# Patient Record
Sex: FEMALE | Employment: UNEMPLOYED | URBAN - METROPOLITAN AREA
[De-identification: names, ages, dates, MRNs, and addresses within clinical notes are randomized per-mention and may not be internally consistent; named-entity substitution may affect disease eponyms.]

---

## 2017-01-11 DIAGNOSIS — M54.17 LUMBOSACRAL RADICULOPATHY AT L5: Primary | ICD-10-CM

## 2017-01-12 ENCOUNTER — TELEPHONE (OUTPATIENT)
Dept: ANESTHESIOLOGY | Facility: CLINIC | Age: 52
End: 2017-01-12

## 2017-01-17 ENCOUNTER — CARE COORDINATION (OUTPATIENT)
Dept: ANESTHESIOLOGY | Facility: CLINIC | Age: 52
End: 2017-01-17

## 2017-01-17 NOTE — PROGRESS NOTES
"Purpose of call: Follow up after left L4 Transforaminal Epidural Steroid performed 01/04/2017  Spoke with: Lesly    Percentage of left lower extremity pain relief after procedure: 50%    Lesly states \"I still have shooting pain in my toes on my left side, but it's much improved.\"     Follow up: She will make an appointment for an MRI and neurosurgery, then schedule a follow up appt with Dr. Villar after these two appointments.  "

## 2017-01-22 DIAGNOSIS — E03.9 HYPOTHYROIDISM, UNSPECIFIED TYPE: ICD-10-CM

## 2017-01-22 LAB
T4 FREE SERPL-MCNC: 0.96 NG/DL (ref 0.76–1.46)
TSH SERPL DL<=0.05 MIU/L-ACNC: 2.89 MU/L (ref 0.4–4)

## 2017-01-22 PROCEDURE — 84443 ASSAY THYROID STIM HORMONE: CPT | Performed by: NURSE PRACTITIONER

## 2017-01-22 PROCEDURE — 84439 ASSAY OF FREE THYROXINE: CPT | Performed by: NURSE PRACTITIONER

## 2017-01-22 NOTE — PROGRESS NOTES
Chief Complaint   Patient presents with     Blood Draw     Patient with Hypothyroidism here for peripheral lab draw from right arm by TEJAL Villanueva CMA January 22, 2017

## 2017-02-17 ENCOUNTER — TELEPHONE (OUTPATIENT)
Dept: ANESTHESIOLOGY | Facility: CLINIC | Age: 52
End: 2017-02-17

## 2017-02-17 DIAGNOSIS — M79.18 MYOFASCIAL PAIN: ICD-10-CM

## 2017-02-17 DIAGNOSIS — M54.16 LUMBAR RADICULOPATHY: ICD-10-CM

## 2017-02-17 DIAGNOSIS — M54.16 LEFT LUMBAR RADICULITIS: ICD-10-CM

## 2017-02-17 RX ORDER — GABAPENTIN 300 MG/1
300 CAPSULE ORAL 3 TIMES DAILY
Qty: 90 CAPSULE | Refills: 1 | Status: SHIPPED | OUTPATIENT
Start: 2017-02-17 | End: 2017-07-24

## 2017-02-17 RX ORDER — GABAPENTIN 100 MG/1
300 CAPSULE ORAL 2 TIMES DAILY
Qty: 180 CAPSULE | Refills: 1 | Status: SHIPPED | OUTPATIENT
Start: 2017-02-17 | End: 2017-04-02

## 2017-02-17 NOTE — TELEPHONE ENCOUNTER
Fax received from:[id number] - [place], [place] - [address] AT [facility]  Requesting refill for: Gabapentin 300 mg, Gabapentin 100 mg.  Medication is currently being prescribed by Doctor Villar.  JAVIDN reviewed Patient's chart, called pt and confirmed their dose.  Pt states they are still titrating up to the 300-300-900. And are currently taking 200-200-900.    This is a standard refill request with no adjustments made to the pt's dose.  Medication refilled and E-prescribed to pharmacy.  Last seen in clinic on 12/16/17 by Dr. Villar, who stated in note to titrate as tolerated to 300-300-900  Pt does not have a follow up appointment scheduled at this time, but was encouraged to contact the office to schedule an appointment and to [facility] with [facility] on how the Titration is going.      Pt was agreeable and verbalized understanding.   Nesha Santana LPN

## 2017-02-21 ENCOUNTER — OFFICE VISIT (OUTPATIENT)
Dept: INTERNAL MEDICINE | Facility: CLINIC | Age: 52
End: 2017-02-21

## 2017-02-21 VITALS
BODY MASS INDEX: 28.49 KG/M2 | DIASTOLIC BLOOD PRESSURE: 80 MMHG | HEART RATE: 88 BPM | WEIGHT: 155.8 LBS | SYSTOLIC BLOOD PRESSURE: 129 MMHG

## 2017-02-21 DIAGNOSIS — H81.12 BENIGN POSITIONAL VERTIGO, LEFT: ICD-10-CM

## 2017-02-21 DIAGNOSIS — E03.4 HYPOTHYROIDISM DUE TO ACQUIRED ATROPHY OF THYROID: Primary | ICD-10-CM

## 2017-02-21 DIAGNOSIS — H81.12 BENIGN POSITIONAL VERTIGO, LEFT: Primary | ICD-10-CM

## 2017-02-21 DIAGNOSIS — E03.4 HYPOTHYROIDISM DUE TO ACQUIRED ATROPHY OF THYROID: ICD-10-CM

## 2017-02-21 LAB
BASOPHILS # BLD AUTO: 0.1 10E9/L (ref 0–0.2)
BASOPHILS NFR BLD AUTO: 1 %
CRP SERPL-MCNC: <2.9 MG/L (ref 0–8)
DIFFERENTIAL METHOD BLD: NORMAL
EOSINOPHIL # BLD AUTO: 0.2 10E9/L (ref 0–0.7)
EOSINOPHIL NFR BLD AUTO: 1.7 %
ERYTHROCYTE [DISTWIDTH] IN BLOOD BY AUTOMATED COUNT: 12.6 % (ref 10–15)
ERYTHROCYTE [SEDIMENTATION RATE] IN BLOOD BY WESTERGREN METHOD: 12 MM/H (ref 0–20)
HCT VFR BLD AUTO: 41.1 % (ref 35–47)
HGB BLD-MCNC: 13.9 G/DL (ref 11.7–15.7)
IMM GRANULOCYTES # BLD: 0.1 10E9/L (ref 0–0.4)
IMM GRANULOCYTES NFR BLD: 0.8 %
LYMPHOCYTES # BLD AUTO: 3.5 10E9/L (ref 0.8–5.3)
LYMPHOCYTES NFR BLD AUTO: 35.4 %
MCH RBC QN AUTO: 30.9 PG (ref 26.5–33)
MCHC RBC AUTO-ENTMCNC: 33.8 G/DL (ref 31.5–36.5)
MCV RBC AUTO: 91 FL (ref 78–100)
MONOCYTES # BLD AUTO: 0.4 10E9/L (ref 0–1.3)
MONOCYTES NFR BLD AUTO: 3.9 %
NEUTROPHILS # BLD AUTO: 5.6 10E9/L (ref 1.6–8.3)
NEUTROPHILS NFR BLD AUTO: 57.2 %
NRBC # BLD AUTO: 0 10*3/UL
NRBC BLD AUTO-RTO: 0 /100
PLATELET # BLD AUTO: 288 10E9/L (ref 150–450)
RBC # BLD AUTO: 4.5 10E12/L (ref 3.8–5.2)
TSH SERPL DL<=0.05 MIU/L-ACNC: 6.07 MU/L (ref 0.4–4)
WBC # BLD AUTO: 9.9 10E9/L (ref 4–11)

## 2017-02-21 RX ORDER — ONDANSETRON 4 MG/1
TABLET, FILM COATED ORAL
Qty: 40 TABLET | Refills: 1 | Status: SHIPPED | OUTPATIENT
Start: 2017-02-21

## 2017-02-21 RX ORDER — LEVOTHYROXINE SODIUM 137 UG/1
137 TABLET ORAL DAILY
Qty: 90 TABLET | Refills: 3 | Status: SHIPPED | OUTPATIENT
Start: 2017-02-21 | End: 2017-04-04 | Stop reason: DRUGHIGH

## 2017-02-21 RX ORDER — MECLIZINE HYDROCHLORIDE 25 MG/1
25 TABLET ORAL 3 TIMES DAILY PRN
Qty: 40 TABLET | Refills: 1 | Status: SHIPPED | OUTPATIENT
Start: 2017-02-21

## 2017-02-21 NOTE — PROGRESS NOTES
HPI: Lesly Roman is a 51 year old female who comes in for medication refills and routine follow-up.  She developed acute dizziness which she noted upon rising from her bed yesterday.  It persisted throughout the day.  Worse with head movement.  She is concerned it could be related to her previous meningitis. She relates pressure in the posterior and left parietal discomfort. She has acute nausea with head movement but has no had any emesis.  She used her last Ondanstron.     States she is taking her levothyroxine daily on empty stomach.     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 She has been able to continue to work.    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    Current Outpatient Prescriptions   Medication Sig Dispense Refill     gabapentin (NEURONTIN) 100 MG capsule Take 3 capsules (300 mg) by mouth 2 times daily 180 capsule 1     gabapentin (NEURONTIN) 300 MG capsule Take 1 capsule (300 mg) by mouth 3 times daily Take 900mg at bedtime 90 capsule 1     DULoxetine (CYMBALTA) 60 MG EC capsule Take 1 capsule (60 mg) by mouth daily Take on daily for a week and if no side effects increase to 2 tabs per day 30 capsule 1     levothyroxine (SYNTHROID,LEVOTHROID) 100 MCG tablet Take 1 tablet (100 mcg) by mouth daily 90 tablet 3     order for DME Equipment being ordered: TENS 1 Units 0     omeprazole (PRILOSEC) 20 MG capsule Take 1 capsule (20 mg) by mouth daily 90 capsule 1     vitamin D (ERGOCALCIFEROL) 01860 UNIT capsule Take 50,000 Units by mouth once a week       cyclobenzaprine (FLEXERIL) 10 MG tablet Take 1 tablet (10 mg) by mouth 3 times daily as needed for muscle spasms 42  tablet 3     ondansetron (ZOFRAN) 4 MG tablet Take 1-2 pills every 4 hours for nausea as needed.  Maximum of 6 tablets daily 20 tablet 1         ALLERGIES: Penicillins    PAST MEDICAL HX:   Past Medical History   Diagnosis Date     Amenorrhea, secondary ~2002     per pt s/p endometrial ablation for excessive vaginal bleeding     Meningitis      viral/3 episodes/inpatient at this exam     Other specified hypothyroidism 6/30/2015       PAST SURGICAL HX:   Past Surgical History   Procedure Laterality Date     Cholecystectomy  1993     Tubal ligation  1993     Appendectomy  1983     Inject epidural transforaminal lumbar / sacral single N/A 4/28/2016     Procedure: INJECT EPIDURAL TRANSFORAMINAL LUMBAR / SACRAL SINGLE;  Surgeon: Jean Alba MD;  Location: [facility] OR     Hysteroscopy, ablate endometrium versapoint , combined       Inject epidural transforaminal lumbar / sacral single Left 1/4/2017     Procedure: INJECT EPIDURAL TRANSFORAMINAL LUMBAR / SACRAL SINGLE;  Surgeon: Huy Villar MD;  Location: [facility] OR       IMMUNIZATION HX:   Immunization History   Administered Date(s) Administered     TD (ADULT, 7+) 05/30/2002     Tdap (Adacel,Boostrix) 11/10/2010       SOCIAL HX:   Social History     Social History Narrative    .  4 grown children.  Has a boyfriend. Occupation:      ROS: 7 system ROS reviewed w/o changes except for above    OBJECTIVE:  /80 (BP Location: Right arm, Patient Position: Chair, Cuff Size: Adult Regular)  Pulse 88  Wt 70.7 kg (155 lb 12.8 oz)  BMI 28.49 kg/m2   Wt Readings from Last 1 Encounters:   02/21/17 70.7 kg (155 lb 12.8 oz)     Constitutional: mildly uncomfortable, pleasant   Eyes: anicteric,conjunctiva pink, normal extra-ocular movements. No nystagmus.    Ears, Nose and Throat:L tympanic membranes clear, Right  TM with air fluid levels, throat clear.   Neck: supple with full range of motion, no thyromegaly.   Cardiovascular: regular rate and rhythm, normal S1 and S2, no  murmurs, rubs or gallops, peripheral pulses full and symmetric   Respiratory: clear to auscultation, no wheezes or crackles, normal breath sounds   Gastrointestinal: positive bowel sounds, nontender, no hepatosplenomegaly, no masses   Musculoskeletal: full range of motion, no edema   Skin: no concerning lesions, no jaundice, temp normal   Neurological: normal gait,normal speech, no tremor.  She had nausea and dizziness with the Epley maneuver L >R.  Eply maneuver performed 4 times each side w/o change in dizziness.    Psychological: appropriate mood   LYMPH: no   supraclavicular, infraclavicular or inguinal nodes.    ASSESSMENT/PLAN:    Benign positional vertigo  Recommend  meclizine 25 mg tid until symptoms resolve.   She was given printed instructions on Eply maneuver to perform at home tid and gave a satisfactory performance of the exercise..    Results for orders placed or performed in visit on 01/22/17   TSH   Result Value Ref Range    TSH 2.89 0.40 - 4.00 mU/L   T4 free   Result Value Ref Range    T4 Free 0.96 0.76 - 1.46 ng/dL     Labs all normal.      Total time spent 25 minutes.  More than 50% of the time spent with Ms. Roman on counseling / coordinating her care    Dominga HARRIS CNP

## 2017-02-21 NOTE — MR AVS SNAPSHOT
After Visit Summary   2/21/2017    Lesly Roman    MRN: [id number]           Patient Information     Date Of Birth          1965        Visit Information        Provider Department      2/21/2017 2:00 PM Dominga Lakhani, APRN CNP [facility]        Today's Diagnoses     Benign positional vertigo, left    -  1    Hypothyroidism due to acquired atrophy of thyroid          Care Instructions      Benign Paroxysmal Positional Vertigo  Benign paroxysmal positional vertigo (BPPV) is a problem with the inner ear. The inner ear contains the vestibular system. This system is what helps you keep your balance. BPPV causes a feeling of spinning. It is a common problem of the vestibular system.  Understanding the vestibular system  The vestibular system of the ear is made up of very tiny parts. They include the utricle, saccule, and semicircular canals. The utricle is a tiny organ that contains calcium crystals. In some people, the crystals can move into the semicircular canals. When this happens, the system no longer works as it should. This causes BPPV. Benign means it is not life-threatening. Paroxysmal means it happens suddenly. Positional means that it happens when you move your head. Vertigo is a feeling of spinning.  What causes BPPV?  Causes include injury to your head or neck. Other problems with the vestibular system may cause BPPV. In many people, the cause of BPPV is not known.  Symptoms of BPPV  You many have repeated feelings of spinning (vertigo). The vertigo usually lasts less than 1 minute. Some movements, suchas rolling over in bed, can bring on vertigo.  Diagnosing BPPV  Your primary health care provider may diagnose and treat your BPPV. Or you may see an ear, nose, and throat doctor (otolaryngologist). In some cases, you may see a nervous system doctor (neurologist).  The health care provider will ask about your symptoms and your medical history. He or she will examine  you. You may have hearing and balance tests. As part of the exam, your health care provider may have you move your head and body in certain ways. If you have BPPV, the movements can bring on vertigo. Your provider will also look for abnormal movements of your eyes. You may have other tests to check your vestibular or nervous systems.  Treatment for BPPV  Your health care provider may try to move the calcium crystals. This is done by having you move your head and neck in certain ways. This treatment is safe and often works well. You may also be told to do these movements at home. You may still have vertigo for a few weeks. Your health care provider will recheck your symptoms, usually in about a month. Special physical therapy may also be part of treatment.  In rare cases surgery may be needed for BPPV that does not go away.     When to call the health care provider  Call your health care provider right away if you have any of these:    Symptoms that do not go away with treatment    Symptoms that get worse    New symptoms        1630-8766 [employer]. [address] [place] [address]. All rights reserved. This information is not intended as a substitute for professional medical care. Always follow your healthcare professional's instructions.              Follow-ups after your visit        Future tests that were ordered for you today     Open Future Orders        Priority Expected Expires Ordered    TSH Routine 3/27/2017 2/21/2018 2/21/2017    CBC with platelets differential Routine 2/21/2017 3/7/2017 2/21/2017    CRP inflammation Routine 2/21/2017 2/21/2018 2/21/2017    Erythrocyte sedimentation rate Routine 2/21/2017 2/21/2018 2/21/2017            Who to contact     Please call your clinic at [phone] to:    Ask questions about your health    Make or cancel appointments    Discuss your medicines    Learn about your test results    Speak to your doctor   If you have compliments or concerns  about an experience at your clinic, or if you wish to file a complaint, please contact [facility] Patient Relations at [phone] or email us at [email or web address]         Additional Information About Your Visit        [email or web address] Information     [employer] gives you secure access to your electronic health record. If you see a primary care provider, you can also send messages to your care team and make appointments. If you have questions, please call your primary care clinic.  If you do not have a primary care provider, please call [phone] and they will assist you.      [employer] is an electronic gateway that provides easy, online access to your medical records. With [employer], you can request a clinic appointment, read your test results, renew a prescription or communicate with your care team.     To access your existing account, please contact your [facility] or call [phone] for assistance.        Care EveryWhere ID     This is your Care EveryWhere ID. This could be used by other organizations to access your [place] medical records  [id number]        Your Vitals Were     Pulse BMI (Body Mass Index)                88 28.49 kg/m2           Blood Pressure from Last 3 Encounters:   02/21/17 129/80   01/04/17 132/89   12/16/16 (!) 136/92    Weight from Last 3 Encounters:   02/21/17 70.7 kg (155 lb 12.8 oz)   01/04/17 67.1 kg (148 lb)   12/16/16 67.1 kg (148 lb)                 Today's Medication Changes          These changes are accurate as of: 2/21/17  2:49 PM.  If you have any questions, ask your nurse or doctor.               Start taking these medicines.        Dose/Directions    meclizine 25 MG tablet   Commonly known as:  ANTIVERT   Used for:  Benign positional vertigo, left   Started by:  Dominga Lakhani APRN CNP        Dose:  25 mg   Take 1 tablet (25 mg) by mouth 3 times daily as needed for dizziness   Quantity:  40 tablet    Refills:  1            Where to get your medicines      These medications were sent to [id number] - [place], [place] - [address], [place] [address]     Phone:  [phone]     meclizine 25 MG tablet    ondansetron 4 MG tablet                Primary Care Provider Office Phone # Fax #    STEVEN Vivar [phone]       [facility] [address] [place] [address]  [facility] [id number]        Thank you!     Thank you for choosing [facility]  for your care. Our goal is always to provide you with excellent care. Hearing back from our patients is one way we can continue to improve our services. Please take a few minutes to complete the written survey that you may receive in the mail after your visit with us. Thank you!             Your Updated Medication List - Protect others around you: Learn how to safely use, store and throw away your medicines at www.disposemymeds.org.          This list is accurate as of: 2/21/17  2:49 PM.  Always use your most recent med list.                   Brand Name Dispense Instructions for use    cyclobenzaprine 10 MG tablet    FLEXERIL    42 tablet    Take 1 tablet (10 mg) by mouth 3 times daily as needed for muscle spasms       DULoxetine 60 MG EC capsule    CYMBALTA    30 capsule    Take 1 capsule (60 mg) by mouth daily Take on daily for a week and if no side effects increase to 2 tabs per day       * gabapentin 100 MG capsule    NEURONTIN    180 capsule    Take 3 capsules (300 mg) by mouth 2 times daily       * gabapentin 300 MG capsule    NEURONTIN    90 capsule    Take 1 capsule (300 mg) by mouth 3 times daily Take 900mg at bedtime       levothyroxine 100 MCG tablet    SYNTHROID/LEVOTHROID    90 tablet    Take 1 tablet (100 mcg) by mouth daily       meclizine 25 MG tablet    ANTIVERT    40 tablet    Take 1 tablet (25 mg) by mouth 3 times daily as needed for dizziness        omeprazole 20 MG CR capsule    priLOSEC    90 capsule    Take 1 capsule (20 mg) by mouth daily       ondansetron 4 MG tablet    ZOFRAN    40 tablet    Take 1-2 pills every 4 hours for nausea as needed.  Maximum of 6 tablets daily       order for DME     1 Units    Equipment being ordered: TENS       vitamin D 53346 UNIT capsule    ERGOCALCIFEROL     Take 50,000 Units by mouth once a week       * Notice:  This list has 2 medication(s) that are the same as other medications prescribed for you. Read the directions carefully, and ask your doctor or other care provider to review them with you.

## 2017-02-21 NOTE — PATIENT INSTRUCTIONS
Benign Paroxysmal Positional Vertigo  Benign paroxysmal positional vertigo (BPPV) is a problem with the inner ear. The inner ear contains the vestibular system. This system is what helps you keep your balance. BPPV causes a feeling of spinning. It is a common problem of the vestibular system.  Understanding the vestibular system  The vestibular system of the ear is made up of very tiny parts. They include the utricle, saccule, and semicircular canals. The utricle is a tiny organ that contains calcium crystals. In some people, the crystals can move into the semicircular canals. When this happens, the system no longer works as it should. This causes BPPV. Benign means it is not life-threatening. Paroxysmal means it happens suddenly. Positional means that it happens when you move your head. Vertigo is a feeling of spinning.  What causes BPPV?  Causes include injury to your head or neck. Other problems with the vestibular system may cause BPPV. In many people, the cause of BPPV is not known.  Symptoms of BPPV  You many have repeated feelings of spinning (vertigo). The vertigo usually lasts less than 1 minute. Some movements, suchas rolling over in bed, can bring on vertigo.  Diagnosing BPPV  Your primary health care provider may diagnose and treat your BPPV. Or you may see an ear, nose, and throat doctor (otolaryngologist). In some cases, you may see a nervous system doctor (neurologist).  The health care provider will ask about your symptoms and your medical history. He or she will examine you. You may have hearing and balance tests. As part of the exam, your health care provider may have you move your head and body in certain ways. If you have BPPV, the movements can bring on vertigo. Your provider will also look for abnormal movements of your eyes. You may have other tests to check your vestibular or nervous systems.  Treatment for BPPV  Your health care provider may try to move the calcium crystals. This is done  by having you move your head and neck in certain ways. This treatment is safe and often works well. You may also be told to do these movements at home. You may still have vertigo for a few weeks. Your health care provider will recheck your symptoms, usually in about a month. Special physical therapy may also be part of treatment.  In rare cases surgery may be needed for BPPV that does not go away.     When to call the health care provider  Call your health care provider right away if you have any of these:    Symptoms that do not go away with treatment    Symptoms that get worse    New symptoms        0765-0733 [employer]. [address] [place] [address], [place] [address]. All rights reserved. This information is not intended as a substitute for professional medical care. Always follow your healthcare professional's instructions.

## 2017-02-21 NOTE — LETTER
[employer] Customer Service  [facility]  [address], [address]  [place] [address]  Fax: [phone]  Phone: [phone]      2017      Lesly Roman  [address]  [address]  [place] [address]        Dear Lesly,    Thank you for your interest in becoming a [employer] user!    Your access code is: Activation code not generated  Current [employer] Status: Active     Please access the [employer] website at [email or web address].  Below the ID and password fields, select the  Sign Up Now  as New User.  You will be prompted to enter the access code listed above as well as additional personal information.  Please follow the directions carefully when creating your username and password.    If you allow your access code to , or if you have any questions please call a [employer] Representative at [phone] during normal clinic hours.     Sincerely,      [employer] Customer Service  [facility]

## 2017-02-22 NOTE — PROGRESS NOTES
HPI: Lesly Roman is a 51 year old female who comes in for > 24 hours of severe dizziness.  She woke with dizziness when she lino from bed.  She states she had severe dizziness with any movement of her head.  Worse when she sat up. She has pressure in her left lateral head This a.m. she also had severe nausea.  No nausea.   When she tried to walk to the bathroom she veered to the left. She had to walk along the wall for steadiness.  Her head felt like it would explode.  She felt like everything around her was moving.    She also had tingling in her tongue and in her jaw.     She also has some tingling in her left foot and hand but these are not new symptoms.     State she is taking levothyroxine daily on an empty stomach with no eating for one hour after taking.       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    Current Outpatient Prescriptions   Medication Sig Dispense Refill     levothyroxine (SYNTHROID/LEVOTHROID) 137 MCG tablet Take 1 tablet (137 mcg) by mouth daily 90 tablet 3     ondansetron (ZOFRAN) 4 MG tablet Take 1-2 pills every 4 hours for nausea as needed.  Maximum of 6 tablets daily 40 tablet 1     meclizine (ANTIVERT) 25 MG tablet Take 1 tablet (25 mg) by mouth 3 times daily as needed for dizziness 40 tablet 1     gabapentin (NEURONTIN) 100 MG capsule Take 3 capsules (300 mg) by mouth 2 times daily 180 capsule 1     gabapentin (NEURONTIN) 300 MG capsule Take 1 capsule (300 mg) by mouth 3 times daily Take 900mg at bedtime 90 capsule 1     DULoxetine (CYMBALTA) 60 MG EC capsule Take 1 capsule (60 mg) by mouth daily Take on daily for a week and if no side effects increase to 2 tabs per day 30  capsule 1     [DISCONTINUED] levothyroxine (SYNTHROID,LEVOTHROID) 100 MCG tablet Take 1 tablet (100 mcg) by mouth daily 90 tablet 3     order for DME Equipment being ordered: TENS 1 Units 0     omeprazole (PRILOSEC) 20 MG capsule Take 1 capsule (20 mg) by mouth daily 90 capsule 1     vitamin D (ERGOCALCIFEROL) 63942 UNIT capsule Take 50,000 Units by mouth once a week       cyclobenzaprine (FLEXERIL) 10 MG tablet Take 1 tablet (10 mg) by mouth 3 times daily as needed for muscle spasms 42 tablet 3         ALLERGIES: Penicillins    PAST MEDICAL HX:   Past Medical History   Diagnosis Date     Amenorrhea, secondary ~2002     per pt s/p endometrial ablation for excessive vaginal bleeding     Meningitis      viral/3 episodes/inpatient at this exam     Other specified hypothyroidism 6/30/2015       PAST SURGICAL HX:   Past Surgical History   Procedure Laterality Date     Cholecystectomy  1993     Tubal ligation  1993     Appendectomy  1983     Inject epidural transforaminal lumbar / sacral single N/A 4/28/2016     Procedure: INJECT EPIDURAL TRANSFORAMINAL LUMBAR / SACRAL SINGLE;  Surgeon: Jean Alba MD;  Location: [facility] OR     Hysteroscopy, ablate endometrium versapoint , combined       Inject epidural transforaminal lumbar / sacral single Left 1/4/2017     Procedure: INJECT EPIDURAL TRANSFORAMINAL LUMBAR / SACRAL SINGLE;  Surgeon: Huy Villar MD;  Location: [facility] OR       IMMUNIZATION HX:   Immunization History   Administered Date(s) Administered     TD (ADULT, 7+) 05/30/2002     Tdap (Adacel,Boostrix) 11/10/2010       SOCIAL HX:   Social History     Social History Narrative    .  4 grown children.  Has a boyfriend. Occupation:      ROS: 7 system ROS reviewed w/o changes except for above      OBJECTIVE:  There were no vitals taken for this visit.   Wt Readings from Last 1 Encounters:   02/21/17 70.7 kg (155 lb 12.8 oz)     Constitutional: no distress, comfortable, pleasant   Eyes: anicteric,conjunctiva pink,  normal extra-ocular movements.  No nystagmus noted. She voices severe dizziness with EOMI of the eyes.    Ears, Nose and Throat: right TM air/fluid levels of  tympanic membranes clear, left TM normal, nose clear and free of lesions, throat clear.   Neck: supple with full range of motion, no thyromegaly.   Cardiovascular: regular rate and rhythm, normal S1 and S2, no murmurs, rubs or gallops, peripheral pulses full and symmetric   Respiratory: clear to auscultation, no wheezes or crackles, normal breath sounds   Gastrointestinal: positive bowel sounds, nontender, no hepatosplenomegaly, no masses   Musculoskeletal: full range of motion, no edema   Skin: no concerning lesions, no jaundice, temp normal   Neurological: normal speech, no tremor   Psychological: appropriate mood       ASSESSMENT/PLAN:  Diagnoses and all orders for this visit:    Hypothyroidism due to acquired atrophy of thyroid  -     levothyroxine (SYNTHROID/LEVOTHROID) 137 MCG tablet; Take 1 tablet (137 mcg) by mouth daily    Sed rate, CRP, CBC and diff.     Benign positional vertigo  Meclazine 25 mg every 8 hours   Ondantestron 4-8 mg every 4 hours as needed.   She has been instructed paroxysmal benign positional vertigo exercises explained and performed by the pt in the exam room.  She was instructed to repeat these three times a day while symptomatic.    Total time spent 25 minutes.  More than 50% of the time spent with Ms. Roman on counseling / coordinating her care    Dominga HARRIS CNP

## 2017-03-21 ENCOUNTER — HOSPITAL ENCOUNTER (EMERGENCY)
Facility: CLINIC | Age: 52
Discharge: HOME | End: 2017-03-21
Attending: EMERGENCY MEDICINE | Admitting: EMERGENCY MEDICINE
Payer: COMMERCIAL

## 2017-03-21 ENCOUNTER — APPOINTMENT (OUTPATIENT)
Dept: MRI IMAGING | Facility: CLINIC | Age: 52
End: 2017-03-21
Attending: EMERGENCY MEDICINE
Payer: COMMERCIAL

## 2017-03-21 VITALS
TEMPERATURE: 98 F | BODY MASS INDEX: 27.6 KG/M2 | HEIGHT: 62 IN | HEART RATE: 80 BPM | WEIGHT: 150 LBS | OXYGEN SATURATION: 100 % | RESPIRATION RATE: 16 BRPM | DIASTOLIC BLOOD PRESSURE: 94 MMHG | SYSTOLIC BLOOD PRESSURE: 123 MMHG

## 2017-03-21 DIAGNOSIS — R42 VERTIGO: ICD-10-CM

## 2017-03-21 DIAGNOSIS — R11.2 NAUSEA AND VOMITING, INTRACTABILITY OF VOMITING NOT SPECIFIED, UNSPECIFIED VOMITING TYPE: ICD-10-CM

## 2017-03-21 LAB
ANION GAP SERPL CALCULATED.3IONS-SCNC: 8 MMOL/L (ref 3–14)
BASOPHILS # BLD AUTO: 0.1 10E9/L (ref 0–0.2)
BASOPHILS NFR BLD AUTO: 0.5 %
BUN SERPL-MCNC: 10 MG/DL (ref 7–30)
CALCIUM SERPL-MCNC: 8.6 MG/DL (ref 8.5–10.1)
CHLORIDE SERPL-SCNC: 107 MMOL/L (ref 94–109)
CO2 SERPL-SCNC: 28 MMOL/L (ref 20–32)
CREAT SERPL-MCNC: 0.67 MG/DL (ref 0.52–1.04)
DIFFERENTIAL METHOD BLD: NORMAL
EOSINOPHIL # BLD AUTO: 0.2 10E9/L (ref 0–0.7)
EOSINOPHIL NFR BLD AUTO: 1.7 %
ERYTHROCYTE [DISTWIDTH] IN BLOOD BY AUTOMATED COUNT: 13.4 % (ref 10–15)
GFR SERPL CREATININE-BSD FRML MDRD: NORMAL ML/MIN/1.7M2
GLUCOSE SERPL-MCNC: 91 MG/DL (ref 70–99)
HCT VFR BLD AUTO: 39.7 % (ref 35–47)
HGB BLD-MCNC: 13.1 G/DL (ref 11.7–15.7)
IMM GRANULOCYTES # BLD: 0 10E9/L (ref 0–0.4)
IMM GRANULOCYTES NFR BLD: 0.4 %
LYMPHOCYTES # BLD AUTO: 3.1 10E9/L (ref 0.8–5.3)
LYMPHOCYTES NFR BLD AUTO: 33.1 %
MCH RBC QN AUTO: 30.5 PG (ref 26.5–33)
MCHC RBC AUTO-ENTMCNC: 33 G/DL (ref 31.5–36.5)
MCV RBC AUTO: 92 FL (ref 78–100)
MONOCYTES # BLD AUTO: 0.5 10E9/L (ref 0–1.3)
MONOCYTES NFR BLD AUTO: 4.8 %
NEUTROPHILS # BLD AUTO: 5.6 10E9/L (ref 1.6–8.3)
NEUTROPHILS NFR BLD AUTO: 59.5 %
NRBC # BLD AUTO: 0 10*3/UL
NRBC BLD AUTO-RTO: 0 /100
PLATELET # BLD AUTO: 290 10E9/L (ref 150–450)
POTASSIUM SERPL-SCNC: 3.7 MMOL/L (ref 3.4–5.3)
RBC # BLD AUTO: 4.3 10E12/L (ref 3.8–5.2)
SODIUM SERPL-SCNC: 143 MMOL/L (ref 133–144)
T4 FREE SERPL-MCNC: 0.79 NG/DL (ref 0.76–1.46)
TSH SERPL DL<=0.05 MIU/L-ACNC: 15.7 MU/L (ref 0.4–4)
WBC # BLD AUTO: 9.5 10E9/L (ref 4–11)

## 2017-03-21 PROCEDURE — 84439 ASSAY OF FREE THYROXINE: CPT | Performed by: EMERGENCY MEDICINE

## 2017-03-21 PROCEDURE — 84443 ASSAY THYROID STIM HORMONE: CPT | Performed by: EMERGENCY MEDICINE

## 2017-03-21 PROCEDURE — 25500064 ZZH RX 255 OP 636: Performed by: EMERGENCY MEDICINE

## 2017-03-21 PROCEDURE — 96361 HYDRATE IV INFUSION ADD-ON: CPT | Mod: 59

## 2017-03-21 PROCEDURE — 99284 EMERGENCY DEPT VISIT MOD MDM: CPT | Mod: Z6 | Performed by: EMERGENCY MEDICINE

## 2017-03-21 PROCEDURE — 70549 MR ANGIOGRAPH NECK W/O&W/DYE: CPT

## 2017-03-21 PROCEDURE — 80048 BASIC METABOLIC PNL TOTAL CA: CPT | Performed by: EMERGENCY MEDICINE

## 2017-03-21 PROCEDURE — 25000132 ZZH RX MED GY IP 250 OP 250 PS 637: Performed by: EMERGENCY MEDICINE

## 2017-03-21 PROCEDURE — 99285 EMERGENCY DEPT VISIT HI MDM: CPT | Mod: 25

## 2017-03-21 PROCEDURE — 85025 COMPLETE CBC W/AUTO DIFF WBC: CPT | Performed by: EMERGENCY MEDICINE

## 2017-03-21 PROCEDURE — A9585 GADOBUTROL INJECTION: HCPCS | Performed by: EMERGENCY MEDICINE

## 2017-03-21 PROCEDURE — 70553 MRI BRAIN STEM W/O & W/DYE: CPT

## 2017-03-21 PROCEDURE — 25000128 H RX IP 250 OP 636: Performed by: EMERGENCY MEDICINE

## 2017-03-21 PROCEDURE — 96374 THER/PROPH/DIAG INJ IV PUSH: CPT

## 2017-03-21 RX ORDER — DIAZEPAM 5 MG
TABLET ORAL
Status: DISCONTINUED
Start: 2017-03-21 | End: 2017-03-21 | Stop reason: HOSPADM

## 2017-03-21 RX ORDER — LIDOCAINE 40 MG/G
CREAM TOPICAL
Status: DISCONTINUED | OUTPATIENT
Start: 2017-03-21 | End: 2017-03-21 | Stop reason: HOSPADM

## 2017-03-21 RX ORDER — DIAZEPAM 5 MG
5 TABLET ORAL ONCE
Status: COMPLETED | OUTPATIENT
Start: 2017-03-21 | End: 2017-03-21

## 2017-03-21 RX ORDER — DIAZEPAM 5 MG
5 TABLET ORAL EVERY 6 HOURS PRN
Qty: 12 TABLET | Refills: 0 | Status: SHIPPED | OUTPATIENT
Start: 2017-03-21 | End: 2017-06-19

## 2017-03-21 RX ORDER — GADOBUTROL 604.72 MG/ML
7.5 INJECTION INTRAVENOUS ONCE
Status: COMPLETED | OUTPATIENT
Start: 2017-03-21 | End: 2017-03-21

## 2017-03-21 RX ORDER — ONDANSETRON 2 MG/ML
8 INJECTION INTRAMUSCULAR; INTRAVENOUS ONCE
Status: COMPLETED | OUTPATIENT
Start: 2017-03-21 | End: 2017-03-21

## 2017-03-21 RX ADMIN — SODIUM CHLORIDE 1000 ML: 9 INJECTION, SOLUTION INTRAVENOUS at 13:21

## 2017-03-21 RX ADMIN — SODIUM CHLORIDE 1000 ML: 9 INJECTION, SOLUTION INTRAVENOUS at 15:30

## 2017-03-21 RX ADMIN — GADOBUTROL 7.5 ML: 604.72 INJECTION INTRAVENOUS at 14:33

## 2017-03-21 RX ADMIN — ONDANSETRON 8 MG: 2 INJECTION INTRAMUSCULAR; INTRAVENOUS at 13:21

## 2017-03-21 RX ADMIN — DIAZEPAM 5 MG: 5 TABLET ORAL at 13:22

## 2017-03-21 RX ADMIN — DIAZEPAM 5 MG: 5 TABLET ORAL at 15:30

## 2017-03-21 NOTE — ED NOTES
Pt was dropped off to the ED by her son with c/o dizziness, headache, nausea and some disorientation. She was seen by her primary MD about 3 weeks ago for the same symptoms and was started on Meclizine and Zofran. She also had some lip numbness that started about 3 weeks ago as well that comes and goes. The medication she was given has not helped. She states her lips are again numb today. No other neuro deficits to report. She has hx of migraines.

## 2017-03-21 NOTE — DISCHARGE INSTRUCTIONS
AUDIOLOGY AND AURAL REHABILITATION SERVICES  Floor 4    Appointments: [phone]  Provider Referrals: [phone]  Information: [phone]

## 2017-03-21 NOTE — ED AVS SNAPSHOT
[facility], Emergency Department    [address] [address]    Phone:  [phone]                                       Lesly Roman   MRN: [id number]    Department:  [facility], Emergency Department   Date of Visit:  3/21/2017           Patient Information     Date Of Birth          1965        Your diagnoses for this visit were:     Vertigo     Nausea and vomiting, intractability of vomiting not specified, unspecified vomiting type        You were seen by Berta Camacho MD.      Follow-up Information     Follow up with Dominga Lakhani APRN CNP In 1 week.    Specialty:  Nurse Practitioner    Contact information:    [facility]  [address]  [facility] [address] [phone]          Discharge Instructions       AUDIOLOGY AND AURAL REHABILITATION SERVICES  Floor 4    Appointments: [phone]  Provider Referrals: [phone]  Information: [phone]    24 Hour Appointment Hotline       To make an appointment at any [facility] clinic, call [phone] ([phone]). If you don't have a family doctor or clinic, we will help you find one. [facility] clinics are conveniently located to serve the needs of you and your family.             Review of your medicines      START taking        Dose / Directions Last dose taken    diazepam 5 MG tablet   Commonly known as:  VALIUM   Dose:  5 mg   Quantity:  12 tablet        Take 1 tablet (5 mg) by mouth every 6 hours as needed for anxiety or sleep   Refills:  0          Our records show that you are taking the medicines listed below. If these are incorrect, please call your family doctor or clinic.        Dose / Directions Last dose taken    cyclobenzaprine 10 MG tablet   Commonly known as:  FLEXERIL   Dose:  10 mg   Quantity:  42 tablet        Take 1 tablet (10 mg) by mouth 3 times daily as needed for muscle spasms   Refills:  3        DULoxetine 60 MG EC capsule   Commonly known as:  CYMBALTA   Dose:  60 mg    Quantity:  30 capsule        Take 1 capsule (60 mg) by mouth daily Take on daily for a week and if no side effects increase to 2 tabs per day   Refills:  1        * gabapentin 100 MG capsule   Commonly known as:  NEURONTIN   Dose:  300 mg   Quantity:  180 capsule        Take 3 capsules (300 mg) by mouth 2 times daily   Refills:  1        * gabapentin 300 MG capsule   Commonly known as:  NEURONTIN   Dose:  300 mg   Quantity:  90 capsule        Take 1 capsule (300 mg) by mouth 3 times daily Take 900mg at bedtime   Refills:  1        levothyroxine 137 MCG tablet   Commonly known as:  SYNTHROID/LEVOTHROID   Dose:  137 mcg   Quantity:  90 tablet        Take 1 tablet (137 mcg) by mouth daily   Refills:  3        meclizine 25 MG tablet   Commonly known as:  ANTIVERT   Dose:  25 mg   Quantity:  40 tablet        Take 1 tablet (25 mg) by mouth 3 times daily as needed for dizziness   Refills:  1        omeprazole 20 MG CR capsule   Commonly known as:  priLOSEC   Dose:  20 mg   Quantity:  90 capsule        Take 1 capsule (20 mg) by mouth daily   Refills:  1        ondansetron 4 MG tablet   Commonly known as:  ZOFRAN   Quantity:  40 tablet        Take 1-2 pills every 4 hours for nausea as needed.  Maximum of 6 tablets daily   Refills:  1        order for DME   Quantity:  1 Units        Equipment being ordered: TENS   Refills:  0        vitamin D 99630 UNIT capsule   Commonly known as:  ERGOCALCIFEROL   Dose:  19845 Units        Take 50,000 Units by mouth once a week   Refills:  0        * Notice:  This list has 2 medication(s) that are the same as other medications prescribed for you. Read the directions carefully, and ask your doctor or other care provider to review them with you.            Prescriptions were sent or printed at these locations (1 Prescription)                   Other Prescriptions                Printed at Department/Unit printer (1 of 1)         diazepam (VALIUM) 5 MG tablet                Procedures and  tests performed during your visit     Basic metabolic panel    CBC with platelets differential    MR Brain for Stroke Cmpl w/o & w Contras    Orthostatic blood pressure    Peripheral IV: Standard    T4 free    TSH      Orders Needing Specimen Collection     None      Pending Results     No orders found from 3/19/2017 to 3/22/2017.            Pending Culture Results     No orders found from 3/19/2017 to 3/22/2017.            Thank you for choosing [facility]       Thank you for choosing [facility] for your care. Our goal is always to provide you with excellent care. Hearing back from our patients is one way we can continue to improve our services. Please take a few minutes to complete the written survey that you may receive in the mail after you visit with us. Thank you!        [facility] Information     [facility] gives you secure access to your electronic health record. If you see a primary care provider, you can also send messages to your care team and make appointments. If you have questions, please call your primary care clinic.  If you do not have a primary care provider, please call [phone] and they will assist you.        Care EveryWhere ID     This is your Care EveryWhere ID. This could be used by other organizations to access your [facility] medical records  [id number]        After Visit Summary       This is your record. Keep this with you and show to your community pharmacist(s) and doctor(s) at your next visit.

## 2017-03-21 NOTE — ED AVS SNAPSHOT
[facility], [place], Emergency Department    [address] [place] [address]    Phone:  [phone]                                       Lesly Roman   MRN: [id number]    Department:  [facility], Emergency Department   Date of Visit:  3/21/2017           After Visit Summary Signature Page     I have received my discharge instructions, and my questions have been answered. I have discussed any challenges I see with this plan with the nurse or doctor.    ..........................................................................................................................................  Patient/Patient Representative Signature      ..........................................................................................................................................  Patient Representative Print Name and Relationship to Patient    ..................................................               ................................................  Date                                            Time    ..........................................................................................................................................  Reviewed by Signature/Title    ...................................................              ..............................................  Date                                                            Time

## 2017-03-21 NOTE — ED PROVIDER NOTES
"  History     Chief Complaint   Patient presents with     Dizziness     Headache     Nausea     Altered Mental Status     HPI  Lesly Roman is a 51 year old female with a history of benign recurrent lymphocytic meningitis (3 episodes, last in 5/2014) and hypothyroidism who presents for evaluation of dizziness. The patient reports that she's been struggling with dizziness over the past month. She describes that it has been constant since onset and that there is room-spinning intermittently. She indicates that her dizziness is not positional. She denies a history of similar dizziness occurring previously. The patient was seen in primary-care clinic on 2/21/17 and diagnosed with BPPV, started on meclizine. She reports that she initially had some improvement in her dizziness with meclizine, but it never fully resolved. Over the past few days, unfortunately, her dizziness has been worsening, returning to its previous intensity. She reports that she's also begun to hear a \"whooshing\" sound in both ears. She, additionally, endorses nausea without vomiting, occasional headaches, and intermittent tingling in her lips. As noted above, the patient has a history of recurrent meningitis. She definitively expresses that her symptoms today are unlike those that she experienced with her previous bouts of meningitis.    I have reviewed the Medications, Allergies, Past Medical and Surgical History, and Social History in the Epic system.    Review of Systems   ROS: 10 point ROS neg other than the symptoms noted above in the HPI.    Physical Exam   BP: 140/87  Pulse: 80  Temp: 98  F (36.7  C)  Resp: 16  Height: 157.5 cm (5' 2\")  Weight: 68 kg (150 lb)  SpO2: 99 %  Physical Exam   Constitutional: She is oriented to person, place, and time. She appears well-developed and well-nourished.   HENT:   Head: Atraumatic.   Right Ear: External ear normal.   Left Ear: External ear normal.   Eyes: Conjunctivae and EOM are normal. No scleral " icterus.   Neck: Normal range of motion. Neck supple.   Cardiovascular: Normal rate and regular rhythm.    Pulmonary/Chest: Effort normal. No respiratory distress.   Abdominal: Soft. There is no tenderness. There is no rebound and no guarding.   Musculoskeletal: Normal range of motion. She exhibits no edema or tenderness.   Neurological: She is alert and oriented to person, place, and time. She has normal strength. No cranial nerve deficit. Coordination and gait normal. GCS eye subscore is 4. GCS verbal subscore is 5. GCS motor subscore is 6.   No nystagmus   Skin: Skin is warm and dry. No rash noted.   Psychiatric: She has a normal mood and affect. Her behavior is normal.   Nursing note and vitals reviewed.      ED Course     ED Course     12:45 PM  The patient was seen and examined by Berta Camacho MD, in Room 07.     Procedures       Labs Ordered and Resulted from Time of ED Arrival Up to the Time of Departure from the ED   TSH - Abnormal; Notable for the following:        Result Value    TSH 15.70 (*)     All other components within normal limits   CBC WITH PLATELETS DIFFERENTIAL   BASIC METABOLIC PANEL   T4 FREE   PERIPHERAL IV CATHETER   ORTHOSTATIC BLOOD PRESSURE AND PULSE       Assessments & Plan (with Medical Decision Making)   51 year old female with episodic dizzness. CNS exam is normal. Her symptoms sound more consistent with peripheral but given her persistent symtpoms we will obtain head imaging.  - MRI was negative. Normal vasculature, no evidence of CVA  - symptoms improved completley after valuim, IVF and zofran  - Offered obs admission but pt decliend given the improvement in her symptoms  - will DC with ENT/dizziness center follow up  To return to ED if any concern    I have reviewed the nursing notes.    I have reviewed the findings, diagnosis, plan and need for follow up with the patient.    Discharge Medication List as of 3/21/2017  5:18 PM      START taking these medications    Details    diazepam (VALIUM) 5 MG tablet Take 1 tablet (5 mg) by mouth every 6 hours as needed for anxiety or sleep, Disp-12 tablet, R-0, Local Print             Final diagnoses:   Vertigo   Nausea and vomiting, intractability of vomiting not specified, unspecified vomiting type     I, Rahul Philippe, am serving as a trained medical scribe to document services personally performed by Berta Camacho MD, based on the provider's statements to me.      I, Berta Camacho MD, was physically present and have reviewed and verified the accuracy of this note documented by Rahul Philippe.    3/21/2017   [facility], [place], EMERGENCY DEPARTMENT     Berta Camacho MD  03/23/17 0626

## 2017-04-02 ENCOUNTER — HOSPITAL ENCOUNTER (EMERGENCY)
Facility: CLINIC | Age: 52
Discharge: HOME | End: 2017-04-02
Attending: EMERGENCY MEDICINE | Admitting: EMERGENCY MEDICINE
Payer: COMMERCIAL

## 2017-04-02 ENCOUNTER — APPOINTMENT (OUTPATIENT)
Dept: GENERAL RADIOLOGY | Facility: CLINIC | Age: 52
End: 2017-04-02
Attending: EMERGENCY MEDICINE
Payer: COMMERCIAL

## 2017-04-02 VITALS
SYSTOLIC BLOOD PRESSURE: 118 MMHG | DIASTOLIC BLOOD PRESSURE: 86 MMHG | HEIGHT: 62 IN | OXYGEN SATURATION: 96 % | BODY MASS INDEX: 27.6 KG/M2 | TEMPERATURE: 98.2 F | RESPIRATION RATE: 14 BRPM | WEIGHT: 150 LBS

## 2017-04-02 DIAGNOSIS — E03.8 HYPOTHYROIDISM DUE TO FIBROUS INVASIVE THYROIDITIS: ICD-10-CM

## 2017-04-02 DIAGNOSIS — R53.83 OTHER FATIGUE: ICD-10-CM

## 2017-04-02 DIAGNOSIS — E06.5 HYPOTHYROIDISM DUE TO FIBROUS INVASIVE THYROIDITIS: ICD-10-CM

## 2017-04-02 DIAGNOSIS — E03.8 OTHER SPECIFIED HYPOTHYROIDISM: ICD-10-CM

## 2017-04-02 DIAGNOSIS — M54.42 BILATERAL LOW BACK PAIN WITH LEFT-SIDED SCIATICA, UNSPECIFIED CHRONICITY: ICD-10-CM

## 2017-04-02 LAB
ALBUMIN SERPL-MCNC: 3.6 G/DL (ref 3.4–5)
ALP SERPL-CCNC: 90 U/L (ref 40–150)
ALT SERPL W P-5'-P-CCNC: 33 U/L (ref 0–50)
ANION GAP SERPL CALCULATED.3IONS-SCNC: 10 MMOL/L (ref 3–14)
AST SERPL W P-5'-P-CCNC: 14 U/L (ref 0–45)
BASOPHILS # BLD AUTO: 0.1 10E9/L (ref 0–0.2)
BASOPHILS NFR BLD AUTO: 1 %
BILIRUB SERPL-MCNC: 0.4 MG/DL (ref 0.2–1.3)
BUN SERPL-MCNC: 5 MG/DL (ref 7–30)
CALCIUM SERPL-MCNC: 8.6 MG/DL (ref 8.5–10.1)
CHLORIDE SERPL-SCNC: 109 MMOL/L (ref 94–109)
CO2 SERPL-SCNC: 24 MMOL/L (ref 20–32)
CREAT SERPL-MCNC: 0.72 MG/DL (ref 0.52–1.04)
DIFFERENTIAL METHOD BLD: NORMAL
EOSINOPHIL # BLD AUTO: 0.2 10E9/L (ref 0–0.7)
EOSINOPHIL NFR BLD AUTO: 2 %
ERYTHROCYTE [DISTWIDTH] IN BLOOD BY AUTOMATED COUNT: 13.1 % (ref 10–15)
GFR SERPL CREATININE-BSD FRML MDRD: 86 ML/MIN/1.7M2
GLUCOSE SERPL-MCNC: 96 MG/DL (ref 70–99)
HCT VFR BLD AUTO: 40.7 % (ref 35–47)
HGB BLD-MCNC: 13.8 G/DL (ref 11.7–15.7)
IMM GRANULOCYTES # BLD: 0.1 10E9/L (ref 0–0.4)
IMM GRANULOCYTES NFR BLD: 0.6 %
LYMPHOCYTES # BLD AUTO: 3.3 10E9/L (ref 0.8–5.3)
LYMPHOCYTES NFR BLD AUTO: 34.4 %
MCH RBC QN AUTO: 31.2 PG (ref 26.5–33)
MCHC RBC AUTO-ENTMCNC: 33.9 G/DL (ref 31.5–36.5)
MCV RBC AUTO: 92 FL (ref 78–100)
MONOCYTES # BLD AUTO: 0.6 10E9/L (ref 0–1.3)
MONOCYTES NFR BLD AUTO: 5.9 %
NEUTROPHILS # BLD AUTO: 5.4 10E9/L (ref 1.6–8.3)
NEUTROPHILS NFR BLD AUTO: 56.1 %
NRBC # BLD AUTO: 0 10*3/UL
NRBC BLD AUTO-RTO: 0 /100
PLATELET # BLD AUTO: 286 10E9/L (ref 150–450)
POTASSIUM SERPL-SCNC: 3.9 MMOL/L (ref 3.4–5.3)
PROT SERPL-MCNC: 7.1 G/DL (ref 6.8–8.8)
RBC # BLD AUTO: 4.43 10E12/L (ref 3.8–5.2)
SODIUM SERPL-SCNC: 143 MMOL/L (ref 133–144)
T4 FREE SERPL-MCNC: 0.74 NG/DL (ref 0.76–1.46)
TSH SERPL DL<=0.005 MIU/L-ACNC: 21.63 MU/L (ref 0.4–4)
WBC # BLD AUTO: 9.7 10E9/L (ref 4–11)

## 2017-04-02 PROCEDURE — 99284 EMERGENCY DEPT VISIT MOD MDM: CPT | Mod: 25 | Performed by: EMERGENCY MEDICINE

## 2017-04-02 PROCEDURE — 96374 THER/PROPH/DIAG INJ IV PUSH: CPT | Performed by: EMERGENCY MEDICINE

## 2017-04-02 PROCEDURE — 99284 EMERGENCY DEPT VISIT MOD MDM: CPT | Mod: Z6 | Performed by: EMERGENCY MEDICINE

## 2017-04-02 PROCEDURE — 25000128 H RX IP 250 OP 636: Performed by: EMERGENCY MEDICINE

## 2017-04-02 PROCEDURE — 72100 X-RAY EXAM L-S SPINE 2/3 VWS: CPT

## 2017-04-02 PROCEDURE — 84443 ASSAY THYROID STIM HORMONE: CPT | Performed by: EMERGENCY MEDICINE

## 2017-04-02 PROCEDURE — 80053 COMPREHEN METABOLIC PANEL: CPT | Performed by: EMERGENCY MEDICINE

## 2017-04-02 PROCEDURE — 85025 COMPLETE CBC W/AUTO DIFF WBC: CPT | Performed by: EMERGENCY MEDICINE

## 2017-04-02 PROCEDURE — 84439 ASSAY OF FREE THYROXINE: CPT | Performed by: EMERGENCY MEDICINE

## 2017-04-02 RX ORDER — KETOROLAC TROMETHAMINE 30 MG/ML
30 INJECTION, SOLUTION INTRAMUSCULAR; INTRAVENOUS ONCE
Status: COMPLETED | OUTPATIENT
Start: 2017-04-02 | End: 2017-04-02

## 2017-04-02 RX ORDER — ONDANSETRON 2 MG/ML
INJECTION INTRAMUSCULAR; INTRAVENOUS
Status: DISCONTINUED
Start: 2017-04-02 | End: 2017-04-02 | Stop reason: HOSPADM

## 2017-04-02 RX ADMIN — KETOROLAC TROMETHAMINE 30 MG: 30 INJECTION, SOLUTION INTRAMUSCULAR at 16:07

## 2017-04-02 ASSESSMENT — ENCOUNTER SYMPTOMS
NAUSEA: 1
PHOTOPHOBIA: 1
HEMATURIA: 0
CONSTIPATION: 0
SHORTNESS OF BREATH: 0
FREQUENCY: 0
ABDOMINAL PAIN: 0
HEADACHES: 1
DIARRHEA: 0
VOMITING: 0
FATIGUE: 1
DYSURIA: 0
NUMBNESS: 1
CHILLS: 1
BACK PAIN: 1

## 2017-04-02 NOTE — ED NOTES
Pt presents with worsening muscle aches in back, neck, and arms joints accompanied by headache, nausea and chills. Pt denies fever at home. Pt took ibuprofen at home with mild relief. Pt reports chronic back pain with neuropathy but this back pain is very different. Pt takes flexaril, valium, and gabpentin. Pt denies any respiratory or urinary symptoms.     Vital Signs - BP: 123/76 Patient Position: Sitting Heart Rate: 65 Pulse/Heart Rate Source: Monitor; Left Resp: 14 SpO2: 100 % O2 Device: None (Room air) Temp: 98.2  F (36.8  C) Temp src: Oral General Capillary Refill: less than/equal to 3 secs Patient Currently in Pain: Yes HR/Pulse Review: 65

## 2017-04-02 NOTE — ED PROVIDER NOTES
History     Chief Complaint   Patient presents with     Musculoskeletal Problem     pain in back, neck, arm joints     Chills     Nausea     HPI  Lesly Roman is a 51 year old female with a history of benign recurrent lymphocytic meningitis (3 episodes, last in 5/2014), somatic symptom disorder and hypothyroidism who presents for evaluation of back pain. Patient complains of deep lower back pain that initially began yesterday morning that is now radiating up her spine and into her bilateral upper extremities. She also complains of a constant occipital headache with associated photophobia and nausea. She states her back pain is worse with movement. She states she feels this is different compared to previous episodes of meningitis or neuropathy. Additionally, she complains of chronic fatigue that has been ongoing for the past few months as well as chills. She notes she has had new numbness in her right ring finger as well. She denies chest pain, shortness of breath, abdominal pain, urinary symptoms or changes in bowel movements. No fever or vomiting. She is right handed.  She denies fever, trauma, or falls.  She denies loss of bowel or bladder control.  Of note, patient reports she was diagnosed with vertigo approximately one month ago, and is concerned because she feels unsafe driving. She is currently prescribed Flexeril, Valium, and gabapentin for neuropathy.  She has stopped taking the Valium because she feels it doesn't help.    I have reviewed the Medications, Allergies, Past Medical and Surgical History, and Social History in the [employer] system.  Past Medical History:   Diagnosis Date     Amenorrhea, secondary ~2002    per pt s/p endometrial ablation for excessive vaginal bleeding     Depressive disorder      Meningitis     viral/3 episodes/inpatient at this exam     Other specified hypothyroidism 6/30/2015       Past Surgical History:   Procedure Laterality Date     APPENDECTOMY  1983     CHOLECYSTECTOMY   1993     HYSTEROSCOPY, ABLATE ENDOMETRIUM VERSAPOINT , COMBINED       INJECT EPIDURAL TRANSFORAMINAL LUMBAR / SACRAL SINGLE N/A 4/28/2016    Procedure: INJECT EPIDURAL TRANSFORAMINAL LUMBAR / SACRAL SINGLE;  Surgeon: Jean Alba MD;  Location: [facility] OR     INJECT EPIDURAL TRANSFORAMINAL LUMBAR / SACRAL SINGLE Left 1/4/2017    Procedure: INJECT EPIDURAL TRANSFORAMINAL LUMBAR / SACRAL SINGLE;  Surgeon: Huy Villar MD;  Location: [facility] OR     TUBAL LIGATION  1993       Family History   Problem Relation Age of Onset     DIABETES Father      Myocardial Infarction Father      CANCER Mother      pancreatic     Anesthesia Reaction No family hx of      Colon Polyps No family hx of      Crohn Disease No family hx of      Ulcerative Colitis No family hx of      Colon Cancer No family hx of      Other Cancer No family hx of        Social History   Substance Use Topics     Smoking status: Current Every Day Smoker     Packs/day: 0.25     Years: 24.00     Types: Cigarettes     Smokeless tobacco: Current User     Alcohol use No     No current facility-administered medications for this encounter.      Current Outpatient Prescriptions   Medication     IBUPROFEN PO     diazepam (VALIUM) 5 MG tablet     ondansetron (ZOFRAN) 4 MG tablet     meclizine (ANTIVERT) 25 MG tablet     levothyroxine (SYNTHROID/LEVOTHROID) 137 MCG tablet     gabapentin (NEURONTIN) 300 MG capsule     DULoxetine (CYMBALTA) 60 MG EC capsule     omeprazole (PRILOSEC) 20 MG capsule     vitamin D (ERGOCALCIFEROL) 72571 UNIT capsule     cyclobenzaprine (FLEXERIL) 10 MG tablet     [DISCONTINUED] gabapentin (NEURONTIN) 100 MG capsule        Allergies   Allergen Reactions     Penicillins Rash       Review of Systems   Constitutional: Positive for chills and fatigue (chronic).   Eyes: Positive for photophobia.   Respiratory: Negative for shortness of breath.    Cardiovascular: Negative for chest pain.   Gastrointestinal: Positive for nausea. Negative for abdominal  "pain, constipation, diarrhea and vomiting.   Genitourinary: Negative for dysuria, frequency, hematuria and urgency.   Musculoskeletal: Positive for back pain (See HPI).   Neurological: Positive for numbness (right ring finger) and headaches (occipital).   All other systems reviewed and are negative.      Physical Exam   BP: 123/76  Heart Rate: 65  Temp: 98.2  F (36.8  C)  Resp: 14  Height: 157.5 cm (5' 2\")  Weight: 68 kg (150 lb)  SpO2: 100 %  Physical Exam   Musculoskeletal:        Back:      Physical Exam   Constitutional:   well nourished, well developed, resting comfortably   HENT:   Head: Normocephalic and atraumatic.   Eyes: Conjunctivae are normal. Pupils are equal, round, and reactive to light.    pharynx has no erythema or exudate, mucous membranes are moist  Neck:   no adenopathy, no bony tenderness  Cardiovascular: regular rate and rhythm without murmurs or gallops  Pulmonary/Chest: Clear to auscultation bilaterally, with no wheezes or retractions. No respiratory distress.  GI: Soft with good bowel sounds.  Non-tender, non-distended, with no guarding, no rebound, no peritoneal signs.   Back:  Diffuse tenderness to low back --see diagram above, No bony or CVA tenderness   Musculoskeletal:  no edema or clubbing   Skin: Skin is warm and dry. No rash noted.   Neurological: alert and oriented to person, place, and time. Nonfocal exam, cranial nerves II through XII grossly intact.  The patient has full  strength.  She has subjective decreased sensation over the palmar aspect of her right ring finger, it is neurovascularly and light touch intact with less than 2 second capillary refill.  Able to ambulate to the restroom, able to lift both legs against gravity and hold them up, reflexes, including patellar, biceps, triceps, and brachioradialis and Achilles reflexes are all 2+ and symmetrical.  Psychiatric:  Flat mood and affect.   ED Course     ED Course     Procedures             Critical Care time:  none   "            Results for orders placed or performed during the hospital encounter of 04/02/17 (from the past 24 hour(s))   CBC with platelets differential   Result Value Ref Range    WBC 9.7 4.0 - 11.0 10e9/L    RBC Count 4.43 3.8 - 5.2 10e12/L    Hemoglobin 13.8 11.7 - 15.7 g/dL    Hematocrit 40.7 35.0 - 47.0 %    MCV 92 78 - 100 fl    MCH 31.2 26.5 - 33.0 pg    MCHC 33.9 31.5 - 36.5 g/dL    RDW 13.1 10.0 - 15.0 %    Platelet Count 286 150 - 450 10e9/L    Diff Method Automated Method     % Neutrophils 56.1 %    % Lymphocytes 34.4 %    % Monocytes 5.9 %    % Eosinophils 2.0 %    % Basophils 1.0 %    % Immature Granulocytes 0.6 %    Nucleated RBCs 0 0 /100    Absolute Neutrophil 5.4 1.6 - 8.3 10e9/L    Absolute Lymphocytes 3.3 0.8 - 5.3 10e9/L    Absolute Monocytes 0.6 0.0 - 1.3 10e9/L    Absolute Eosinophils 0.2 0.0 - 0.7 10e9/L    Absolute Basophils 0.1 0.0 - 0.2 10e9/L    Abs Immature Granulocytes 0.1 0 - 0.4 10e9/L    Absolute Nucleated RBC 0.0    Comprehensive metabolic panel   Result Value Ref Range    Sodium 143 133 - 144 mmol/L    Potassium 3.9 3.4 - 5.3 mmol/L    Chloride 109 94 - 109 mmol/L    Carbon Dioxide 24 20 - 32 mmol/L    Anion Gap 10 3 - 14 mmol/L    Glucose 96 70 - 99 mg/dL    Urea Nitrogen 5 (L) 7 - 30 mg/dL    Creatinine 0.72 0.52 - 1.04 mg/dL    GFR Estimate 86 >60 mL/min/1.7m2    GFR Estimate If Black >90   GFR Calc   >60 mL/min/1.7m2    Calcium 8.6 8.5 - 10.1 mg/dL    Bilirubin Total 0.4 0.2 - 1.3 mg/dL    Albumin 3.6 3.4 - 5.0 g/dL    Protein Total 7.1 6.8 - 8.8 g/dL    Alkaline Phosphatase 90 40 - 150 U/L    ALT 33 0 - 50 U/L    AST 14 0 - 45 U/L   TSH with free T4 reflex   Result Value Ref Range    TSH 21.63 (H) 0.40 - 4.00 mU/L   T4 free   Result Value Ref Range    T4 Free 0.74 (L) 0.76 - 1.46 ng/dL   Lumbar spine XR, 2-3 views    Impression    Impression: Chronic bilateral L5 spondylolysis with associated grade 1  anterolisthesis of L5 on S1. No acute fracture.        Labs  Ordered and Resulted from Time of ED Arrival Up to the Time of Departure from the ED   COMPREHENSIVE METABOLIC PANEL - Abnormal; Notable for the following:        Result Value    Urea Nitrogen 5 (*)     All other components within normal limits   TSH WITH FREE T4 REFLEX - Abnormal; Notable for the following:     TSH 21.63 (*)     All other components within normal limits   T4 FREE - Abnormal; Notable for the following:     T4 Free 0.74 (*)     All other components within normal limits   CBC WITH PLATELETS DIFFERENTIAL     Medications   ketorolac (TORADOL) injection 30 mg (30 mg Intravenous Given 4/2/17 1607)      Assessments & Plan (with Medical Decision Making)       I have reviewed the nursing notes.  Emergency Department course:  The patient was seen and examined at 1535 [,.   Chart review shows she had an MRI done on 3/21/2017 that showed Impression:  1. Brain MRI: No acute infarct. No findings to explain patient's  symptoms. Stable minimal leukoaraiosis.  2. Head and neck MRA: Widely patent major intercranial and cervical  arteries. No aneurysm, stenosis or dissection.  Lumbar spine MRI on 01/22/17 that showed: Impression: No significant change since 3/22/2016. Bilateral L5  spondylolysis with grade 1 anterolisthesis of L5 on S1 resulting in  moderate to advanced bilateral L5-S1 neural foraminal stenosis. No  significant spinal canal stenosis.  I do note that the patient's TSH on March 21 was high at 15.70.   She was seen in clinic on 12/28/2016 and diagnosed with Diagnoses: F45.41 Somatic symptom disorder   With predominant pain  Persistent  Severity : moderate  She had an left L4 transforaminal epidural steroid injection done on 01/04/2017.  Laboratory studies show an unremarkable CBC and comprehensive metabolic panel.  TSH is high at 21.63, with a low T4 free of 0.74, suggesting hypothyroidism.  Lumbar spine x-ray shows         Impression: Chronic bilateral L5 spondylolysis with associated grade  1  anterolisthesis of L5 on S1. No acute fracture.     The [employer] computer system did crash during this patient's ED course and part of her charting is on downtime charting.  I treated the patient with Toradol IV, which helped her pain quite a bit.  I believe the patient is safe to be discharged home.  The patient appears to have chronic back pain which is acutely worsened without evidence of trauma.  She has no fevers and no loss of bowel or bladder control.  She was discharged home with a low back pain information sheet and exercises to strengthen her abdomen.  I also prescribed a Medrol Dosepak on discharge for pain.  She should follow-up with her regular physician to adjust her thyroid medication.  The patient has vague neurologic complaints.  She was given the number to the neurology clinic for follow-up.  Regarding her back pain, this appears to be long-standing.  She should follow up with neurosurgery or orthopedics for further evaluation.    I have reviewed the findings, diagnosis, plan and need for follow up with the patient.  Discharge Medication List as of 4/2/2017  6:30 PM          Final diagnoses:   Bilateral low back pain with left-sided sciatica, unspecified chronicity   Other specified hypothyroidism   Other fatigue   I, Jyoti Gaviria, am serving as a trained medical scribe to document services personally performed by Santa Coyne MD, based on the provider's statements to me.   I, Santa Coyne MD, was physically present and have reviewed and verified the accuracy of this note documented by Jyoti Gaviria.  This note was created in part by the use of Dragon voice recognition dictation system. Inadvertent grammatical errors and typographical errors may still exist.  Santa Coyne MD        4/2/2017   [facility], EMERGENCY DEPARTMENT     Santa Coyne MD  04/02/17 [id number]

## 2017-04-04 DIAGNOSIS — E06.3 AUTOIMMUNE HYPOTHYROIDISM: Primary | ICD-10-CM

## 2017-04-04 RX ORDER — LEVOTHYROXINE SODIUM 150 UG/1
150 TABLET ORAL DAILY
Qty: 30 TABLET | Refills: 1 | Status: SHIPPED | OUTPATIENT
Start: 2017-04-04 | End: 2017-06-20

## 2017-04-05 ENCOUNTER — TELEPHONE (OUTPATIENT)
Dept: INTERNAL MEDICINE | Facility: CLINIC | Age: 52
End: 2017-04-05

## 2017-04-05 NOTE — TELEPHONE ENCOUNTER
Call to pt, discussed the new directions on the levothyroxine , pt verbalize understanding.  Ivet Cronin RN  ----------------      ----- Message from STEVEN Vivar CNP sent at 4/4/2017  4:12 PM CDT -----  Regarding: FW: Levothyroxine  Please ask her to take 150 mcg levothyroxine daily in a.m. on empty stomach and avoid oral intake for one hour.  Repeat lab in 6 weeks.  (May 18th or so).    Thanks.   ----- Message -----     From: Ivet Cronin RN     Sent: 4/4/2017   9:07 AM       To: STEVEN Vivar CNP  Subject: Levothyroxine                                    Spoke with pt, said she is taking Levothyroxine 137 mcg daily on an empty stomach and wait for an hour to eat.  Thanks,  Ivet Cronin RN     ----- Message -----     From: Dominga Lakhani APRN CNP     Sent: 4/3/2017   1:43 PM       To: Ivet Cronin RN    Can you call her and ask her if she is taking her synthroid?    Thanks.     Dominga HARRIS CNP

## 2017-04-14 NOTE — TELEPHONE ENCOUNTER
LPN called and left a VM for patient, trying to confirm their dose of Gabapentin.   Waiting for call back.   Nesha Santana LPN

## 2017-05-08 DIAGNOSIS — R42 VERTIGO: Primary | ICD-10-CM

## 2017-05-11 ENCOUNTER — OFFICE VISIT (OUTPATIENT)
Dept: NEUROSURGERY | Facility: CLINIC | Age: 52
End: 2017-05-11

## 2017-05-11 VITALS — HEIGHT: 62 IN | DIASTOLIC BLOOD PRESSURE: 77 MMHG | HEART RATE: 74 BPM | SYSTOLIC BLOOD PRESSURE: 116 MMHG

## 2017-05-11 DIAGNOSIS — G89.29 CHRONIC BILATERAL LOW BACK PAIN WITH BILATERAL SCIATICA: Primary | ICD-10-CM

## 2017-05-11 DIAGNOSIS — M54.42 CHRONIC BILATERAL LOW BACK PAIN WITH BILATERAL SCIATICA: Primary | ICD-10-CM

## 2017-05-11 DIAGNOSIS — M54.41 CHRONIC BILATERAL LOW BACK PAIN WITH BILATERAL SCIATICA: Primary | ICD-10-CM

## 2017-05-11 DIAGNOSIS — M43.16 SPONDYLOLISTHESIS OF LUMBAR REGION: ICD-10-CM

## 2017-05-11 ASSESSMENT — ENCOUNTER SYMPTOMS
SMELL DISTURBANCE: 0
JOINT SWELLING: 0
MEMORY LOSS: 0
PANIC: 0
TASTE DISTURBANCE: 1
PARALYSIS: 0
DISTURBANCES IN COORDINATION: 1
WEAKNESS: 1
INSOMNIA: 1
NECK PAIN: 1
TROUBLE SWALLOWING: 0
MYALGIAS: 0
SORE THROAT: 0
HOARSE VOICE: 0
NECK MASS: 0
NUMBNESS: 1
MUSCLE WEAKNESS: 1
SPEECH CHANGE: 0
MUSCLE CRAMPS: 0
SINUS CONGESTION: 0
HEADACHES: 1
BACK PAIN: 1
DEPRESSION: 0
SINUS PAIN: 0
TREMORS: 0
LOSS OF CONSCIOUSNESS: 0
SEIZURES: 0
STIFFNESS: 1
DIZZINESS: 1
DECREASED CONCENTRATION: 1
NERVOUS/ANXIOUS: 1
TINGLING: 1

## 2017-05-11 ASSESSMENT — PAIN SCALES - GENERAL: PAINLEVEL: SEVERE PAIN (6)

## 2017-05-11 NOTE — NURSING NOTE
Chief Complaint   Patient presents with     Consult     Lumbosacral radiculopathy at L5    Bj Rodriguez CMA

## 2017-05-11 NOTE — LETTER
5/11/2017       RE: Lesly Roman  [address]  [place] [address]     Dear Colleague,    Thank you for referring your patient, Lesly Roman, to the [facility] NEUROSURGERY at [facility]. Please see a copy of my visit note below.      TEMPLATED DRAFT ONLY;  FINAL DICTATED NOTE TO FOLLOW.  Neurosurgery Clinic Consult  Date of Visit: 5/11/2017    Referring Provider:          Treatment history:  SURGERIES:  EMG 5/26/16  Results:    Bilateral sural and left medial plantar antidromic sensory NCSs were normal.      Bilateral tibial and peroneal motor NCSs were normal.    Left deep peroneal F-wave minimal latency was normal.      EMG of the left lower extremity muscles was normal. EMG of the left lumbosacral paraspinals at S1 level showed 1+ positive sharp waves.        Interpretation:  Results:    Bilateral sural and left medial plantar antidromic sensory NCSs were normal.      Bilateral tibial and peroneal motor NCSs were normal.    Left deep peroneal F-wave minimal latency was normal.    EMG of the left lower extremity muscles was normal. EMG of the left lumbosacral paraspinals at S1 level showed 1+ positive sharp waves.  Abnormal study due to:     1) Abnormal spontaneous activity in lumbar paraspinal muscles. This finding alone is not diagnostic and can be seen with lumbosacral radiculopathies, but also in a certain percentage of normal individuals over age 40, or patients with diabetes mellitus. Clinical correlation is recommended.      2) No electrodiagnostic evidence of tibial, deep peroneal neuropathies in bilateral lower extremities or medial plantar neuropathy in the left lower extremity.        PT:   TRACTION:   ORAL STEROIDS: medrol in ER 4/2/17  CHIROPRACTIC:  INJECTIONS:  She had an left L4 transforaminal epidural steroid injection done on 01/04/2017 L5 was ordered both order an procedure by Dr Villar  Helped for about an hour with back pain.   4/28/16   Also same level.  CHEMO:   XRT:   PERTINENT MEDS:     Mg/24hrs      Mg/24hrs        Mg/24hrs   NICOTINE:     smoker  PAIN CONTRACT:     Symptom description:  Reviewed with patient;  Patient Supplied Answers To the [facility] Pain Questionnaire  [facility] Pain -  Patient Entered Questionnaire/Answers 5/11/2017   What number best describes your pain right now:  0 = No pain  to  10 = Worst pain imaginable 6   How would you describe the pain? numbness, throbbing   Which of the following worsen your pain? lying down, standing, sitting, walking   Which of the following improve or reduce your pain?  nothing relieves the pain   What number best describes your average pain for the past week:  0 = No pain  to  10 = Worst pain imaginable 8   What number best describes your LOWEST pain in past 24 hours:  0 = No pain  to  10 = Worst pain imaginable 4   What number best describes your WORST pain in past 24 hours:  0 = No pain  to  10 = Worst pain imaginable 9   When is your pain worst? PM, Night   What non-medicine treatments have you already had for your pain? pain clinic, spine injections (shots), counseling, exercise   Have you tried treating your pain with medication?  Yes   Are you currently taking medications for your pain? No       Current Outpatient Prescriptions:      levothyroxine (SYNTHROID/LEVOTHROID) 150 MCG tablet, Take 1 tablet (150 mcg) by mouth daily Lab in 6 weeks., Disp: 30 tablet, Rfl: 1     IBUPROFEN PO, Take 200-600 mg by mouth as needed for moderate pain, Disp: , Rfl:      diazepam (VALIUM) 5 MG tablet, Take 1 tablet (5 mg) by mouth every 6 hours as needed for anxiety or sleep, Disp: 12 tablet, Rfl: 0     ondansetron (ZOFRAN) 4 MG tablet, Take 1-2 pills every 4 hours for nausea as needed.  Maximum of 6 tablets daily, Disp: 40 tablet, Rfl: 1     meclizine (ANTIVERT) 25 MG tablet, Take 1 tablet (25 mg) by mouth 3 times daily as needed for dizziness, Disp: 40 tablet, Rfl: 1     gabapentin (NEURONTIN) 300 MG capsule, Take 1  capsule (300 mg) by mouth 3 times daily Take 900mg at bedtime, Disp: 90 capsule, Rfl: 1     DULoxetine (CYMBALTA) 60 MG EC capsule, Take 1 capsule (60 mg) by mouth daily Take on daily for a week and if no side effects increase to 2 tabs per day, Disp: 30 capsule, Rfl: 1     omeprazole (PRILOSEC) 20 MG capsule, Take 1 capsule (20 mg) by mouth daily, Disp: 90 capsule, Rfl: 1     vitamin D (ERGOCALCIFEROL) 08081 UNIT capsule, Take 50,000 Units by mouth once a week, Disp: , Rfl:      cyclobenzaprine (FLEXERIL) 10 MG tablet, Take 1 tablet (10 mg) by mouth 3 times daily as needed for muscle spasms, Disp: 42 tablet, Rfl: 3    Allergies   Allergen Reactions     Penicillins Rash       Past Medical History:   Diagnosis Date     Amenorrhea, secondary ~2002    per pt s/p endometrial ablation for excessive vaginal bleeding     Depressive disorder      Meningitis     viral/3 episodes/inpatient at this exam     Other specified hypothyroidism 6/30/2015       Past Surgical History:   Procedure Laterality Date     APPENDECTOMY  1983     CHOLECYSTECTOMY  1993     HYSTEROSCOPY, ABLATE ENDOMETRIUM VERSAPOINT , COMBINED       INJECT EPIDURAL TRANSFORAMINAL LUMBAR / SACRAL SINGLE N/A 4/28/2016    Procedure: INJECT EPIDURAL TRANSFORAMINAL LUMBAR / SACRAL SINGLE;  Surgeon: Jean Alba MD;  [facility]     INJECT EPIDURAL TRANSFORAMINAL LUMBAR / SACRAL SINGLE Left 1/4/2017    Procedure: INJECT EPIDURAL TRANSFORAMINAL LUMBAR / SACRAL SINGLE;  Surgeon: Hyu Villar MD;  Location: [facility]     TUBAL LIGATION  1993       Family History   Problem Relation Age of Onset     DIABETES Father      Myocardial Infarction Father      CANCER Mother      pancreatic     Anesthesia Reaction No family hx of      Colon Polyps No family hx of      Crohn Disease No family hx of      Ulcerative Colitis No family hx of      Colon Cancer No family hx of      Other Cancer No family hx of        Social History     Social History     Marital status: Single  "    Spouse name: N/A     Number of children: N/A     Years of education: N/A     Occupational History     /homecare       Social History Main Topics     Smoking status: Current Every Day Smoker     Packs/day: 0.25     Years: 24.00     Types: Cigarettes     Smokeless tobacco: Current User     Alcohol use No     Drug use: No     Sexual activity: Yes     Partners: Male     Other Topics Concern     Parent/Sibling W/ Cabg, Mi Or Angioplasty Before 65f 55m? No      Service No     Blood Transfusions No     Caffeine Concern No     Occupational Exposure No     Hobby Hazards No     Sleep Concern No     Weight Concern No     Special Diet No     Back Care No     Exercise No     Seat Belt Yes     Social History Narrative    .  4 grown children.  Has a boyfriend. Occupation:      Problem list and 13 point review of systems: is reviewed in Epic and is negative with the exception of those symptoms associated with HPI and PMH.      OBJECTIVE:   /77  Pulse 74  Ht 1.575 m (5' 2\")    Imaging:  These are the pertinent radiologist's findings from:  MRI CT Xray's W/WO @ [facility] .   Chart review shows she had an MRI done on 3/21/2017 that showed Impression:  1. Brain MRI: No acute infarct. No findings to explain patient's  symptoms. Stable minimal leukoaraiosis.  2. Head and neck MRA: Widely patent major intercranial and cervical  arteries. No aneurysm, stenosis or dissection.  Lumbar spine MRI on 01/22/17 that showed: Impression: No significant change since 3/22/2016. Bilateral L5  spondylolysis with grade 1 anterolisthesis of L5 on S1 resulting in  moderate to advanced bilateral L5-S1 neural foraminal stenosis. No  significant spinal canal stenosis.        See the full report in EPIC/Media tab.  I personally reviewed the images with the patient.      Exam:  Pertinent positives:    *   Well developed, well nourished * found seated comfortably in exam room chair.  SHe is able to sit and rise " independently.  SHe is accompanied by *.    *  Mental Status  A&O X3.  Bright, alert, affable, interactive. Language fluid, fund of knowledge intact. Good historian.  Mood and affect congruent; flat    *  Cranial Nerves  II: Able to read printed forms, VF full to gross confrontation.  III: IV, VI:  PERRLA, EOMI, No nystagmus, no ptosis.  V: Sensation intact in bilateral V1, V2, and V3. Jaw clench symmetric.    VII:  Intact to voice bilaterally.  IX:  Pushes tongue against bilateral cheeks.  X:  Palate elevates, uvula midline, phonation intact.  XI: Elevates shoulders, head turn intact.  XII: Tongue midline. No fasciculations.  *  Cerebellar:  Finger nose accurate. Heel shin intact.   Rapid alternating movements smooth.  *  Miguel-cranial:    No drift.  *  Cervical Spine:  DTR's at Biceps, Triceps, and Brachioradialis 2/4 and symmetric.  Sensation intact.    No Hancock's. No Phalen's.  No Tinel's. No fasciculations.   Muscle bulk and tone WNL.  Upper Extremity Strength.              RIGHT                LEFT     Deltoid              5/5                   5/5       Biceps              5/5                   5/5        Triceps              5/5                   5/5       Wrist Extensor              5/5                   5/5                     5/5                    5/5       Interossei              5/5                   5/5       EPL              5/5                    5/5       Pinch              5/5                  5/5            *  Lumbar Spine:    Able to heel and toe stand/walk.   DTR's Patellar and Achilles 1/4 and symmetric.   No fasciculations.  Muscle bulk and tone WNL.  No Clonus.  Sensation intact.    Lower Extremity Strength                   RIGHT                   LEFT     Iliopsoas                    5/5                      5/5       Quad                    5/5                        5/5       Hamstring                      5/5                        5/5         Gastrocs                    5/5                         5/5       Tib. Anterior                     5/5                        5/5       EHL                      5/5                        5/5       Babinski                 Down                                      Down                     *  Structural Exam  Inspection of the spine reveals no scoliosis, exaggerated kyphosis or lordosis.. Head is balanced over the pelvis in the coronal and sagittal plane.    Cervical spine ROM full. No spasming. No tenderness to palpation.  No Spurling's or L'Hermitte's.   Lumbar ROM full.  Able to doff and don socks without difficulty. No spasming, no tenderness, no step offs. No SLR.  No SI tenderness. No trochanteric bursal tenderness. Negative Gabriela's. Negative Yuan's.  Feet are warm, intact dorsalis pedis pulses  Gait narrow, smooth, no scissoring. No antalgia.   *  Sensory level intact.   *  Cuong's 5/5 are negative.   (Cuong's are:   non anatomic tenderness;   pain with simulated exam, trunk rotation or axial load;    positive distraction tests e.g.. SLR;    regional disturbances, non anatomic pain, numbness, weakness;  overreaction to testing.)    ASSESSMENT:  No diagnosis found.    PLAN:  Overview    CT lumbar wo  Flex Ex  See Dr Elisa Gardner,    Ruthy Peck PA-C  [facility]  Department of Neurosurgery  Phone: [phone]  Fax: [phone]        Total time: 60 minutes with more than 30 minutes spent in direct face to face contact reviewing films, providing education, counseling, the importance of good health habits including cessation of nicotine, non-operative therapies,and indications for surgery as well as further follow up.

## 2017-05-11 NOTE — PROGRESS NOTES
Neurosurgery Clinic Consult  Date of Visit: 5/11/2017    Referring Provider:  Huy Villar MD    PRIMARY CARE PROVIDER:  Dominga Lakhani NP      Dear Dr. Villar:      We were happy to see Lesly Roman in our Neurosurgery clinic today for her chronic low back pain and left leg numbness.      She is a pleasant 41-year-old lady with a past medical history significant for recurrent herpes virus meningitis and chronic back and neck pain.  She has been seen by a number of providers over the years, Dr. Alba and Dr. Villar, and has had multiple procedures over the years, multiple medications over the years.  She also was noting that she is hypothyroid, has an elevation in TSH.  She is following up with her primary care team for that.  She has multiple joint and muscle pains with bilateral hand intermittent numbness and tingling, numbness and tingling in the bilateral feet, left is worse than right and has been longer standing.  She has tried physical therapy, injections, and overall not much is making a difference for her.      However now she reports that she has had a weakness in the left leg starting about a year to year and a half ago, numbness and tingling for the past year, perhaps.  Back pain is now pretty much constant. Pain diagram is noted and is scanned into the chart, but basically her pain begins in the low lumbar spine and radiates bilaterally across to the left and the right but more to the left.  It radiates down the buttock and the lateral aspect of the left thigh.  Beyond there, she has hypersensitivity of touch to the skin and persistent numbness in the top and the bottom of the left foot, now starting on the right foot in a similar pattern.  She feels as though she is tripping a lot and feels unsure about going up and down stairs, mostly because the left leg and foot are particularly numb.  The pain is described as burning, numb and stabbing pain.  Intensity ranges from 4 to an 8.  It  tends to be worse in the evening and night.  Aggravating factors are time of day walking, fatigue and stress.  She seems better in the mornings and she is moving about.  She has stress incontinence, but no other bowel or bladder dysfunction.      Recent treatments and studies have been EMG of 05/26/2016 which is copied into the record as noted below.  She had a left L4/5 transforaminal epidural steroid injection done on 01/04/2017 by Dr. Villar which helped for about an hour with her back pain but no sustained relief.  She had a similar injection on 04/28/2016, same level, same side and again helped for perhaps about an hour with back pain, but no significant change in her leg symptoms and no sustained relief.  She does admit to being a smoker.  She admits to about 5 cigarettes per day.       EMG 5/26/16  Results: Bilateral sural and left medial plantar antidromic sensory NCSs were normal.  Bilateral tibial and peroneal motor NCSs were normal.Left deep peroneal F-wave minimal latency was normal.  EMG of the left lower extremity muscles was normal. EMG of the left lumbosacral paraspinals at S1 level showed 1+ positive sharp waves.   Interpretation:  Results: Bilateral sural and left medial plantar antidromic sensory NCSs were normal.  Bilateral tibial and peroneal motor NCSs were normal.Left deep peroneal F-wave minimal latency was normal.    EMG of the left lower extremity muscles was normal. EMG of the left lumbosacral paraspinals at S1 level showed 1+ positive sharp waves.  Abnormal study due to:     1) Abnormal spontaneous activity in lumbar paraspinal muscles. This finding alone is not diagnostic and can be seen with lumbosacral radiculopathies, but also in a certain percentage of normal individuals over age 40, or patients with diabetes mellitus. Clinical correlation is recommended.      2) No electrodiagnostic evidence of tibial, deep peroneal neuropathies in bilateral lower extremities or medial plantar  neuropathy in the left lower extremity.     Current Outpatient Prescriptions:      levothyroxine (SYNTHROID/LEVOTHROID) 150 MCG tablet, Take 1 tablet (150 mcg) by mouth daily Lab in 6 weeks., Disp: 30 tablet, Rfl: 1     IBUPROFEN PO, Take 200-600 mg by mouth as needed for moderate pain, Disp: , Rfl:      diazepam (VALIUM) 5 MG tablet, Take 1 tablet (5 mg) by mouth every 6 hours as needed for anxiety or sleep, Disp: 12 tablet, Rfl: 0     ondansetron (ZOFRAN) 4 MG tablet, Take 1-2 pills every 4 hours for nausea as needed.  Maximum of 6 tablets daily, Disp: 40 tablet, Rfl: 1     meclizine (ANTIVERT) 25 MG tablet, Take 1 tablet (25 mg) by mouth 3 times daily as needed for dizziness, Disp: 40 tablet, Rfl: 1     gabapentin (NEURONTIN) 300 MG capsule, Take 1 capsule (300 mg) by mouth 3 times daily Take 900mg at bedtime, Disp: 90 capsule, Rfl: 1     DULoxetine (CYMBALTA) 60 MG EC capsule, Take 1 capsule (60 mg) by mouth daily Take on daily for a week and if no side effects increase to 2 tabs per day, Disp: 30 capsule, Rfl: 1     omeprazole (PRILOSEC) 20 MG capsule, Take 1 capsule (20 mg) by mouth daily, Disp: 90 capsule, Rfl: 1     vitamin D (ERGOCALCIFEROL) 98793 UNIT capsule, Take 50,000 Units by mouth once a week, Disp: , Rfl:      cyclobenzaprine (FLEXERIL) 10 MG tablet, Take 1 tablet (10 mg) by mouth 3 times daily as needed for muscle spasms, Disp: 42 tablet, Rfl: 3    Allergies   Allergen Reactions     Penicillins Rash       Past Medical History:   Diagnosis Date     Amenorrhea, secondary ~2002    per pt s/p endometrial ablation for excessive vaginal bleeding     Depressive disorder      Meningitis     viral/3 episodes/inpatient at this exam     Other specified hypothyroidism 6/30/2015       Past Surgical History:   Procedure Laterality Date     APPENDECTOMY  1983     CHOLECYSTECTOMY  1993     HYSTEROSCOPY, ABLATE ENDOMETRIUM VERSAPOINT , COMBINED       INJECT EPIDURAL TRANSFORAMINAL LUMBAR / SACRAL SINGLE N/A  "4/28/2016    Procedure: INJECT EPIDURAL TRANSFORAMINAL LUMBAR / SACRAL SINGLE;  Surgeon: Jean Alba MD;  Location: [facility]     INJECT EPIDURAL TRANSFORAMINAL LUMBAR / SACRAL SINGLE Left 1/4/2017    Procedure: INJECT EPIDURAL TRANSFORAMINAL LUMBAR / SACRAL SINGLE;  Surgeon: Huy Villar MD;  Location: [facility]     TUBAL LIGATION  1993       Family History   Problem Relation Age of Onset     DIABETES Father      Myocardial Infarction Father      CANCER Mother      pancreatic     Anesthesia Reaction No family hx of      Colon Polyps No family hx of      Crohn Disease No family hx of      Ulcerative Colitis No family hx of      Colon Cancer No family hx of      Other Cancer No family hx of        Social History     Social History     Marital status: Single     Spouse name: N/A     Number of children: N/A     Years of education: N/A     Occupational History     /homecare       Social History Main Topics     Smoking status: Current Every Day Smoker     Packs/day: 0.25     Years: 24.00     Types: Cigarettes     Smokeless tobacco: Current User     Alcohol use No     Drug use: No     Sexual activity: Yes     Partners: Male     Other Topics Concern     Parent/Sibling W/ Cabg, Mi Or Angioplasty Before 65f 55m? No      Service No     Blood Transfusions No     Caffeine Concern No     Occupational Exposure No     Hobby Hazards No     Sleep Concern No     Weight Concern No     Special Diet No     Back Care No     Exercise No     Seat Belt Yes     Social History Narrative    .  4 grown children.  Has a boyfriend. Occupation:      Problem list and 13 point review of systems: is reviewed in Epic and is negative with the exception of those symptoms associated with HPI and PMH.      OBJECTIVE:   /77  Pulse 74  Ht 1.575 m (5' 2\")    Imaging:  These are the pertinent radiologist's findings from:    MRI brain on 3/21/2017  1. Brain MRI: No acute infarct. No findings to explain " patient's  symptoms. Stable minimal leukoaraiosis.  2. Head and neck MRA: Widely patent major intercranial and cervical  arteries. No aneurysm, stenosis or dissection.  Lumbar spine MRI on 01/22/17   Impression: No significant change since 3/22/2016. Bilateral L5  spondylolysis with grade 1 anterolisthesis of L5 on S1 resulting in  moderate to advanced bilateral L5-S1 neural foraminal stenosis. No  significant spinal canal stenosis.   MRI lumbar spine  03/22/2017.    There is a grade 1, nearly grade 2 spondylolisthesis at L5-S1 with bilateral L5-S1 neural foraminal narrowing, no significant canal stenosis.    Plain views of the lumbar spine performed 04/02/2017   Grade 1 listhesis of L5 on S1 nearly a grade II.  No acute problems.  There are bilateral pars defects noted on the plain views.  See the full report in EPIC/Media tab. I personally reviewed the images with the patient.    Exam:  Pertinent positives:  No step-offs, mildly positive bilateral straight leg raise.  Somewhat truncated lumbar range of motion.  Dullness to light touch and pinprick on the lateral aspect of the left foot and the medial aspect of the right foot.  She is able to heel and toe walk.  Overall, coronal and sagittal balance is preserved.  Feet are warm with intact dorsalis pedis pulses.  No antalgia.  No Cuong's.      *   Well developed, well nourished female found seated comfortably in exam room chair.  She is able to sit and rise independently.  She is unaccompanied.    *  Mental Status  A&O X3.  Bright, alert, affable, interactive. Language fluid, fund of knowledge intact. Good historian.  Mood and affect congruent; constricted    *  Cranial Nerves  II: Able to read printed forms, VF full to gross confrontation.  III: IV, VI:  PERRLA, EOMI, No nystagmus, no ptosis.  V: Sensation intact in bilateral V1, V2, and V3. Jaw clench symmetric.    VII:  Intact to voice bilaterally.  IX:  Pushes tongue against bilateral cheeks.  X:  Palate  elevates, uvula midline, phonation intact.  XI: Elevates shoulders, head turn intact.  XII: Tongue midline. No fasciculations.  *  Cerebellar:  Finger nose accurate. Heel shin intact.   Rapid alternating movements smooth.  *  Miguel-cranial:    No drift.  *  Cervical Spine:  DTR's at Biceps, Triceps, and Brachioradialis 2/4 and symmetric.  Sensation intact.    No Hancock's. No Phalen's.  No Tinel's. No fasciculations.   Muscle bulk and tone WNL.  Upper Extremity Strength.              RIGHT                LEFT     Deltoid              5/5                   5/5       Biceps              5/5                   5/5        Triceps              5/5                   5/5       Wrist Extensor              5/5                   5/5                     5/5                    5/5       Interossei              5/5                   5/5       EPL              5/5                    5/5       Pinch              5/5                  5/5            *  Lumbar Spine:    Able to heel and toe stand/walk.   DTR's Patellar and Achilles 1/4 and symmetric.   No fasciculations.  Muscle bulk and tone WNL.  No Clonus.  Sensation: Dullness to light touch and pinprick on the lateral aspect of the left foot and the medial aspect of the right foot.    Lower Extremity Strength                   RIGHT                   LEFT     Iliopsoas                    5/5                      5/5       Quad                    5/5                        5/5       Hamstring                      5/5                        5/5         Gastrocs                    5/5                        5/5       Tib. Anterior                     5/5                        5/5       EHL                      5/5                        5/5       Babinski                 Down                                      Down                     *  Structural Exam  Inspection of the spine reveals no scoliosis, exaggerated kyphosis or lordosis.. Head is balanced over the pelvis in the  coronal and sagittal plane.    Cervical spine ROM full. No spasming. No tenderness to palpation.  No Spurling's or L'Hermitte's.   Lumbar ROM full.  Able to doff and don socks without difficulty. No spasming, no tenderness, no step offs.  Positive SLR vs tight hamstrings.  No SI tenderness. No trochanteric bursal tenderness. Negative Gabriela's. Negative Yuan's.  Feet are warm, intact dorsalis pedis pulses  Gait narrow, smooth, no scissoring. No antalgia.   *  Sensory level intact.     ASSESSMENT/PLAN:  1. Chronic bilateral low back pain with bilateral sciatica    2. Spondylolisthesis of lumbar region      I believe that Ms. Roman has chronic back pain and probable leg symptoms from the significant listhesis at L5-S1.  It is not clear if it is mobile.  She does not have a strong history of mechanical back pain, but we will check some flexion/extension views and we will get a CT scan of the lumbar spine to better define the lumbar bony anatomy.      I did discuss with her that a surgical solution to this problem would be fairly extensive, requiring not just a decompression, but because of the loss of disk space height at L5-S1, an interbody fusion and posterior instrumented fusion would be required as well.  She understands that this would be a major surgery, and while she takes this appropriately seriously, she feels she cannot live with her symptoms anymore,  they are interfering with her life.   She understands that she will have to quit nicotine products for 1 month pre op and 3 months post op.      I have answered all of her questions today.  The questions indicated a good understanding of her situation.  We supplied her with business cards and telephone numbers and urged to call should she have questions, comments or concerns.      It has been our pleasure looking after this very nice patient.  We appreciate your confidence in referring her to the [facility] of Neurosurgery.       Best  Regards,    Ruthy Peck PA-C  [facility]  Department of Neurosurgery  Phone: [phone]  Fax: [phone]        Total time: 60 minutes with more than 30 minutes spent in direct face to face contact reviewing films, providing education, counseling, the importance of good health habits including cessation of nicotine , non-operative therapies,and indications for surgery as well as further follow up.

## 2017-05-11 NOTE — MR AVS SNAPSHOT
After Visit Summary   5/11/2017    Lesly Roman    MRN: [id number]           Patient Information     Date Of Birth          1965        Visit Information        Provider Department      5/11/2017 11:00 AM Ruthy Peck PA-C [facility] Neurosurgery        Today's Diagnoses     Chronic bilateral low back pain with bilateral sciatica    -  1    Spondylolisthesis of lumbar region           Follow-ups after your visit        Your next 10 appointments already scheduled     May 11, 2017  1:45 PM CDT   (Arrive by 1:30 PM)   XR LUMBAR SPINE G/E 4 VIEWS with [facility]   [facility] ([facility])    [address]  1st Floor  [place] [address] [phone]           Please bring a list of your current medicines to your exam. (Include vitamins, minerals and over-thecounter medicines.) Leave your valuables at home.  Tell your doctor if there is a chance you may be pregnant.  You do not need to do anything special for this exam.              Future tests that were ordered for you today     Open Future Orders        Priority Expected Expires Ordered    CT Lumbar spine without contrast [RMG895] Routine  5/11/2018 5/11/2017            Who to contact     Please call your clinic at [phone] to:    Ask questions about your health    Make or cancel appointments    Discuss your medicines    Learn about your test results    Speak to your doctor   If you have compliments or concerns about an experience at your clinic, or if you wish to file a complaint, please contact [facility] Patient Relations at [phone] or email us at [email or web address]         Additional Information About Your Visit        MyChart Information     [employer]utch gives you secure access to your electronic health record. If you see a primary care provider, you can also send messages to your care team and make appointments. If you have questions, please  "call your primary care clinic.  If you do not have a primary care provider, please call [phone] and they will assist you.      [employer] is an electronic gateway that provides easy, online access to your medical records. With [employer], you can request a clinic appointment, read your test results, renew a prescription or communicate with your care team.     To access your existing account, please contact your [facility] or call [phone] for assistance.        Care EveryWhere ID     This is your Care EveryWhere ID. This could be used by other organizations to access your [place] medical records  [id number]        Your Vitals Were     Pulse Height                74 1.575 m (5' 2\")           Blood Pressure from Last 3 Encounters:   05/11/17 116/77   04/02/17 118/86   03/21/17 (!) 123/94    Weight from Last 3 Encounters:   04/02/17 68 kg (150 lb)   03/21/17 68 kg (150 lb)   02/21/17 70.7 kg (155 lb 12.8 oz)              We Performed the Following     X-ray Lumbar spine G/E 4 views (AP, lateral, flexion, extension - standing views preferred) [[id number]]        Primary Care Provider Office Phone # Fax #    Dominga MARY STEVEN Lakhani [phone]       [facility] [address] [place] [address] [address] [place] [id number]        Thank you!     Thank you for choosing [facility]  for your care. Our goal is always to provide you with excellent care. Hearing back from our patients is one way we can continue to improve our services. Please take a few minutes to complete the written survey that you may receive in the mail after your visit with us. Thank you!             Your Updated Medication List - Protect others around you: Learn how to safely use, store and throw away your medicines at www.disposemymeds.org.          This list is accurate as of: 5/11/17 12:16 PM.  Always use your most recent med list.                   Brand Name Dispense Instructions for use    " cyclobenzaprine 10 MG tablet    FLEXERIL    42 tablet    Take 1 tablet (10 mg) by mouth 3 times daily as needed for muscle spasms       diazepam 5 MG tablet    VALIUM    12 tablet    Take 1 tablet (5 mg) by mouth every 6 hours as needed for anxiety or sleep       DULoxetine 60 MG EC capsule    CYMBALTA    30 capsule    Take 1 capsule (60 mg) by mouth daily Take on daily for a week and if no side effects increase to 2 tabs per day       gabapentin 300 MG capsule    NEURONTIN    90 capsule    Take 1 capsule (300 mg) by mouth 3 times daily Take 900mg at bedtime       IBUPROFEN PO      Take 200-600 mg by mouth as needed for moderate pain       levothyroxine 150 MCG tablet    SYNTHROID/LEVOTHROID    30 tablet    Take 1 tablet (150 mcg) by mouth daily Lab in 6 weeks.       meclizine 25 MG tablet    ANTIVERT    40 tablet    Take 1 tablet (25 mg) by mouth 3 times daily as needed for dizziness       omeprazole 20 MG CR capsule    priLOSEC    90 capsule    Take 1 capsule (20 mg) by mouth daily       ondansetron 4 MG tablet    ZOFRAN    40 tablet    Take 1-2 pills every 4 hours for nausea as needed.  Maximum of 6 tablets daily       vitamin D 40430 UNIT capsule    ERGOCALCIFEROL     Take 50,000 Units by mouth once a week

## 2017-06-19 ENCOUNTER — OFFICE VISIT (OUTPATIENT)
Dept: NEUROSURGERY | Facility: CLINIC | Age: 52
End: 2017-06-19

## 2017-06-19 VITALS
WEIGHT: 152.9 LBS | SYSTOLIC BLOOD PRESSURE: 100 MMHG | TEMPERATURE: 97.8 F | DIASTOLIC BLOOD PRESSURE: 74 MMHG | HEART RATE: 88 BPM | HEIGHT: 62 IN | RESPIRATION RATE: 20 BRPM | BODY MASS INDEX: 28.14 KG/M2 | OXYGEN SATURATION: 97 %

## 2017-06-19 DIAGNOSIS — M43.10 SPONDYLOLISTHESIS, UNSPECIFIED SPINAL REGION: Primary | ICD-10-CM

## 2017-06-19 DIAGNOSIS — E06.3 AUTOIMMUNE HYPOTHYROIDISM: ICD-10-CM

## 2017-06-19 LAB — TSH SERPL DL<=0.005 MIU/L-ACNC: 1.76 MU/L (ref 0.4–4)

## 2017-06-19 ASSESSMENT — ENCOUNTER SYMPTOMS
SMELL DISTURBANCE: 1
DISTURBANCES IN COORDINATION: 1
PARALYSIS: 0
NUMBNESS: 1
LOSS OF CONSCIOUSNESS: 0
SINUS PAIN: 0
NECK MASS: 0
HEADACHES: 1
DIZZINESS: 1
BACK PAIN: 1
NERVOUS/ANXIOUS: 1
SEIZURES: 0
STIFFNESS: 1
HOARSE VOICE: 1
SORE THROAT: 0
MUSCLE WEAKNESS: 1
TROUBLE SWALLOWING: 0
PANIC: 0
TASTE DISTURBANCE: 1
ARTHRALGIAS: 0
MEMORY LOSS: 0
INSOMNIA: 1
SINUS CONGESTION: 0
NECK PAIN: 1
SPEECH CHANGE: 0
DEPRESSION: 1
MYALGIAS: 1
WEAKNESS: 1
JOINT SWELLING: 0
TREMORS: 1
MUSCLE CRAMPS: 1
DECREASED CONCENTRATION: 1
TINGLING: 1

## 2017-06-19 ASSESSMENT — PAIN SCALES - GENERAL: PAINLEVEL: MODERATE PAIN (5)

## 2017-06-19 NOTE — PROGRESS NOTES
June 19, 2017            Dominga Lakhani, ELIJAH   Primary Care Center    [address]   [place] [address]      RE:  Lesly Roman      Dear Ms. Lakhani:      I had the pleasure to see Lesly Roman today in my Neurosurgical Clinic for back and leg pain.      Briefly, Lesly is a 52-year-old woman from, [place] believe[place] that immigrated to the [place] and [place].  She works a variety of different jobs, but at the moment, it is difficult to work given her back and leg pain and also headaches that she has.      Her past medical history is significant for meningitis 3 times.  The first 2 were in the early 2000s and then more recently in 2014.  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  Since then, she has had persistent headaches.  About 4-6 months after this last bout, she started to develop more significant back and leg pain.  This has been progressive and includes severe debilitating pain in the lumbar region of her back that is relieved with lying down and aggravated when she stands up.  She also has pain in her legs in the L5 distribution, left greater than right, and it is associated with allodynia.      On examination, she has a foot drop on the left side with 4/5 strength in left dorsiflexion, 5/5 throughout otherwise.      I reviewed her MRI and CT scan of her lumbar spine with her today in clinic.  The CT scan shows a grade I spondylolisthesis of L5 on S1 associated with the broken pars bilateral.      My assessment is that she is symptomatic from this spondylolisthesis at L5 on S1.  She has got progressive symptoms including allodynia and a foot drop on the left leg.  At this point in time, I would recommend an L5-S1 fusion from a posterior lumbar interbody approach.  She is a smoker, and I have told her that we will not move forward on this until she quits smoking.  At this point in time, we will schedule her for surgery at  least 6 weeks out, and then I will see her back in clinic in 4 weeks to ensure that she has quit smoking.  I have asked her to work with you on smoking cessation and would be grateful if you can work with Lesly on helping her to quit smoking.      Overall, I spent approximately 40 minutes with Lesly with the majority of this time spent in consultation and developing a treatment plan.      Thank you for your trust and the opportunity to participate in Lesly's care.  If you have any further questions or concerns, please feel free to contact me on my cell phone at [phone].         ELIF GARZA MD             D: 2017 13:06   T: 2017 14:03   MT: YASMINE      Name:     LESLY TEJADA   MRN:      [id number]        Account:      [id number]   :      1965           Service Date: 2017      Document: [id number]

## 2017-06-19 NOTE — MR AVS SNAPSHOT
After Visit Summary   6/19/2017    Lesly Roman    MRN: [id number]           Patient Information     Date Of Birth          1965        Visit Information        Provider Department      6/19/2017 12:00 PM Han Pearce MD [facility] Neurosurgery        Today's Diagnoses     Spondylolisthesis    -  1      Care Instructions    1. Rahel will call you to discuss dates for surgery.     2. Please follow-up with Dr. Pearce in 4 weeks.     3. Please call GRISEL Wasserman Care Coordinator at [phone] with any further questions or concerns.           Follow-ups after your visit        Follow-up notes from your care team     Return in about 4 weeks (around 7/17/2017).      Your next 10 appointments already scheduled     Jun 23, 2017  8:20 AM CDT   (Arrive by 8:05 AM)   PRE-OP with La Laurent MD   [facility] ([facility] [place])    [address]  [address] [facility] [address] [phone]            Jul 18, 2017  9:40 AM CDT   (Arrive by 9:25 AM)   Return Visit with Han Pearce MD   [facility] Neurosurgery ([facility] [place])    [address] [place] [address]  3rd [facility] [address] [phone]              Who to contact     Please call your clinic at [phone] to:    Ask questions about your health    Make or cancel appointments    Discuss your medicines    Learn about your test results    Speak to your doctor   If you have compliments or concerns about an experience at your clinic, or if you wish to file a complaint, please contact [facility] Patient Relations at [phone] or email us at [email or web address]         Additional Information About Your Visit        MyChart Information     [employer] gives you secure access to your electronic health record. If you see a primary care provider, you can also send messages to your care team and make appointments. If you have  "questions, please call your primary care clinic.  If you do not have a primary care provider, please call [phone] and they will assist you.      [employer] is an electronic gateway that provides easy, online access to your medical records. With [employer], you can request a clinic appointment, read your test results, renew a prescription or communicate with your care team.     To access your existing account, please contact your [facility] or call [phone] for assistance.        Care EveryWhere ID     This is your Care EveryWhere ID. This could be used by other organizations to access your [place] medical records  [id number]        Your Vitals Were     Pulse Temperature Respirations Height Pulse Oximetry Breastfeeding?    88 97.8  F (36.6  C) (Oral) 20 1.562 m (5' 1.5\") 97% No    BMI (Body Mass Index)                   28.42 kg/m2            Blood Pressure from Last 3 Encounters:   06/19/17 100/74   05/18/17 116/71   05/11/17 116/77    Weight from Last 3 Encounters:   06/19/17 69.4 kg (152 lb 14.4 oz)   04/02/17 68 kg (150 lb)   03/21/17 68 kg (150 lb)              We Performed the Following     Florina-Operative Worksheet        Primary Care Provider Office Phone # Fax #    STEVEN Vivar [phone]       PRIMARY CARE CENTER [address] [place] [address] 741  [facility] [address]        Thank you!     Thank you for choosing [facility]  for your care. Our goal is always to provide you with excellent care. Hearing back from our patients is one way we can continue to improve our services. Please take a few minutes to complete the written survey that you may receive in the mail after your visit with us. Thank you!             Your Updated Medication List - Protect others around you: Learn how to safely use, store and throw away your medicines at www.disposemymeds.org.          This list is accurate as of: 6/19/17  1:10 PM.  Always use your most recent med " list.                   Brand Name Dispense Instructions for use    cyclobenzaprine 10 MG tablet    FLEXERIL    42 tablet    Take 1 tablet (10 mg) by mouth 3 times daily as needed for muscle spasms       DULoxetine 60 MG EC capsule    CYMBALTA    30 capsule    Take 1 capsule (60 mg) by mouth daily Take on daily for a week and if no side effects increase to 2 tabs per day       gabapentin 300 MG capsule    NEURONTIN    90 capsule    Take 1 capsule (300 mg) by mouth 3 times daily Take 900mg at bedtime       IBUPROFEN PO      Take 200-600 mg by mouth as needed for moderate pain       levothyroxine 150 MCG tablet    SYNTHROID/LEVOTHROID    30 tablet    Take 1 tablet (150 mcg) by mouth daily Lab in 6 weeks.       meclizine 25 MG tablet    ANTIVERT    40 tablet    Take 1 tablet (25 mg) by mouth 3 times daily as needed for dizziness       omeprazole 20 MG CR capsule    priLOSEC    90 capsule    Take 1 capsule (20 mg) by mouth daily       ondansetron 4 MG tablet    ZOFRAN    40 tablet    Take 1-2 pills every 4 hours for nausea as needed.  Maximum of 6 tablets daily       vitamin D 80397 UNIT capsule    ERGOCALCIFEROL     Take 50,000 Units by mouth once a week

## 2017-06-19 NOTE — NURSING NOTE
Chief Complaint   Patient presents with     Consult     UMP- CHRONIC LBP W/ RADICULOPATHY-JL Kelly, TEJAL

## 2017-06-19 NOTE — PATIENT INSTRUCTIONS
1. Rahel will call you to discuss dates for surgery.     2. Please follow-up with Dr. Pearce in 4 weeks.     3. Please call GRISEL Wasserman Care Coordinator at [phone] with any further questions or concerns.

## 2017-06-19 NOTE — LETTER
6/19/2017       RE: Lesly Roman  [address]  [place] [address]     Dear Colleague,    Thank you for referring your patient, Lesly Roman, to the [facility] NEUROSURGERY at [facility]. Please see a copy of my visit note below.    June 19, 2017      Dominga Lakhani NP   Primary Care Center    [address] [facility] [address]   [place] [address]      RE:  Lesly Roman      Dear Ms. Lakhani:      I had the pleasure to see Lesly Roman today in my Neurosurgical Clinic for back and leg pain.      Briefly, Lesly is a 52-year-old woman from[place] believe[place] that immigrated to the [place].  She works a variety of different jobs, but at the moment, it is difficult to work given her back and leg pain and also headaches that she has.      Her past medical history is significant for meningitis 3 times.  The first 2 were in the early 2000s and then more recently in 2014.  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  Since then, she has had persistent headaches.  About 4-6 months after this last bout, she started to develop more significant back and leg pain.  This has been progressive and includes severe debilitating pain in the lumbar region of her back that is relieved with lying down and aggravated when she stands up.  She also has pain in her legs in the L5 distribution, left greater than right, and it is associated with allodynia.      On examination, she has a foot drop on the left side with 4/5 strength in left dorsiflexion, 5/5 throughout otherwise.      I reviewed her MRI and CT scan of her lumbar spine with her today in clinic.  The CT scan shows a grade I spondylolisthesis of L5 on S1 associated with the broken pars bilateral.      My assessment is that she is symptomatic from this spondylolisthesis at L5 on S1.  She has got progressive symptoms including allodynia and a foot drop on  the left leg.  At this point in time, I would recommend an L5-S1 fusion from a posterior lumbar interbody approach.  She is a smoker, and I have told her that we will not move forward on this until she quits smoking.  At this point in time, we will schedule her for surgery at least 6 weeks out, and then I will see her back in clinic in 4 weeks to ensure that she has quit smoking.  I have asked her to work with you on smoking cessation and would be grateful if you can work with Lesly on helping her to quit smoking.      Overall, I spent approximately 40 minutes with Lesly with the majority of this time spent in consultation and developing a treatment plan.      Thank you for your trust and the opportunity to participate in Lesly's care.  If you have any further questions or concerns, please feel free to contact me on my cell phone at [phone].         ELIF GARZA MD

## 2017-06-20 ENCOUNTER — HOSPITAL ENCOUNTER (INPATIENT)
Facility: CLINIC | Age: 52
Setting detail: SURGERY ADMIT
End: 2017-06-20
Attending: NEUROLOGICAL SURGERY | Admitting: NEUROLOGICAL SURGERY
Payer: COMMERCIAL

## 2017-06-20 DIAGNOSIS — E06.3 AUTOIMMUNE HYPOTHYROIDISM: ICD-10-CM

## 2017-06-20 RX ORDER — LEVOTHYROXINE SODIUM 150 UG/1
150 TABLET ORAL DAILY
Qty: 90 TABLET | Refills: 2 | Status: SHIPPED | OUTPATIENT
Start: 2017-06-20

## 2017-06-20 NOTE — TELEPHONE ENCOUNTER
levothyroxine (SYNTHROID/LEVOTHROID) 150 MCG tablet      Last Written Prescription Date:  4/4/2017  Last Fill Quantity: 30,   # refills: 1  Last Office Visit : 2/21/2017  Future Office visit:  6/23/2017    TSH   Date Value Ref Range Status   06/19/2017 1.76 0.40 - 4.00 mU/L Final   ]

## 2017-06-28 ENCOUNTER — OFFICE VISIT (OUTPATIENT)
Dept: INTERNAL MEDICINE | Facility: CLINIC | Age: 52
End: 2017-06-28

## 2017-06-28 VITALS
WEIGHT: 154.3 LBS | BODY MASS INDEX: 28.68 KG/M2 | DIASTOLIC BLOOD PRESSURE: 71 MMHG | HEART RATE: 78 BPM | RESPIRATION RATE: 20 BRPM | OXYGEN SATURATION: 99 % | SYSTOLIC BLOOD PRESSURE: 108 MMHG

## 2017-06-28 DIAGNOSIS — M54.5 CHRONIC LOW BACK PAIN, UNSPECIFIED BACK PAIN LATERALITY, WITH SCIATICA PRESENCE UNSPECIFIED: ICD-10-CM

## 2017-06-28 DIAGNOSIS — G89.29 CHRONIC LOW BACK PAIN, UNSPECIFIED BACK PAIN LATERALITY, WITH SCIATICA PRESENCE UNSPECIFIED: ICD-10-CM

## 2017-06-28 DIAGNOSIS — Z12.11 SCREEN FOR COLON CANCER: Primary | ICD-10-CM

## 2017-06-28 DIAGNOSIS — Z71.6 ENCOUNTER FOR SMOKING CESSATION COUNSELING: ICD-10-CM

## 2017-06-28 RX ORDER — OXYCODONE HYDROCHLORIDE 5 MG/1
TABLET ORAL
Qty: 30 TABLET | Refills: 0 | Status: SHIPPED | OUTPATIENT
Start: 2017-06-28

## 2017-06-28 RX ORDER — BUPROPION HYDROCHLORIDE 150 MG/1
TABLET, EXTENDED RELEASE ORAL
Qty: 60 TABLET | Refills: 1 | Status: SHIPPED | OUTPATIENT
Start: 2017-06-28 | End: 2017-06-29

## 2017-06-28 ASSESSMENT — PAIN SCALES - GENERAL: PAINLEVEL: EXTREME PAIN (9)

## 2017-06-28 NOTE — PROGRESS NOTES
Chief complaint: Smoking cessation  History of present illness: Lesly is a 52-year-old woman with a past medical history of severe low back pain due to spondylolisthesis and scheduled for surgery by Dr. Han Pearce on August 17, 2017.  Dr. Pearce requires her to quit smoking by at least one month before the operation.  He used to smoke one pack a day for 25 years and in the last few months as already cut down to a half pack per day. She's highly motivated to quit.  In the past she has tried nicotine patches but that has caused her nausea. She like a different medication. She is a good support system at home.    Her back pain is very severe and she is absolutely miserable. She's also requesting some pain medication to help her before she goes to surgery.  She says the pain is debilitating and keeps her up all night. It is severe during the day.  He only medication that is help so far as a gabapentin but it makes her very tired.    Past Medical History:   Diagnosis Date     Amenorrhea, secondary ~2002    per pt s/p endometrial ablation for excessive vaginal bleeding     Depressive disorder      Meningitis     viral/3 episodes/inpatient at this exam     Other specified hypothyroidism 6/30/2015     Past Surgical History:   Procedure Laterality Date     APPENDECTOMY  1983     CHOLECYSTECTOMY  1993     HYSTEROSCOPY, ABLATE ENDOMETRIUM VERSAPOINT , COMBINED       INJECT EPIDURAL TRANSFORAMINAL LUMBAR / SACRAL SINGLE N/A 4/28/2016    Procedure: INJECT EPIDURAL TRANSFORAMINAL LUMBAR / SACRAL SINGLE;  Surgeon: Jean Alba MD;  Location: [facility] OR     INJECT EPIDURAL TRANSFORAMINAL LUMBAR / SACRAL SINGLE Left 1/4/2017    Procedure: INJECT EPIDURAL TRANSFORAMINAL LUMBAR / SACRAL SINGLE;  Surgeon: Huy Villar MD;  Location: [facility] OR     TUBAL LIGATION  1993     Social History     Social History     Marital status: Single     Spouse name: N/A     Number of children: N/A     Years of education: N/A     Occupational  History     /homecare       Social History Main Topics     Smoking status: Current Every Day Smoker     Packs/day: 0.25     Years: 24.00     Types: Cigarettes     Smokeless tobacco: Current User     Alcohol use No     Drug use: No     Sexual activity: Yes     Partners: Male     Other Topics Concern     Parent/Sibling W/ Cabg, Mi Or Angioplasty Before 65f 55m? No      Service No     Blood Transfusions No     Caffeine Concern No     Occupational Exposure No     Hobby Hazards No     Sleep Concern No     Weight Concern No     Special Diet No     Back Care No     Exercise No     Seat Belt Yes     Social History Narrative    .  4 grown children.  Has a boyfriend. Occupation:         ROS: 4 point ROS neg other than the symptoms noted above in the HPI.    /71  Pulse 78  Resp 20  Wt 70 kg (154 lb 4.8 oz)  SpO2 99%  Breastfeeding? No  BMI 28.68 kg/m2  There is no physical exam today    aSSESSMENT  1.  Chronic bilateral low back pain with bilateral sciatica. Having severe pain and is scheduled for and L5 S1 fusion on August 17, 2017. I prescribed oxycodone, 30 tablets for her to take for the most severe pain. No refills.    2.  Smoking cessation : we've agreed to start wellbutrin  mg once daily then increased to twice daily after three days. I referred her to [facility] for smoking cessation monitoring as well.    I spent a total of 25 minutes face-to-face with Lesly Roman during today's office visit.  Over 50% of this time was spent counseling the patient and/or coordinating care regarding smoking cessation and pain management.  See note for details.

## 2017-06-28 NOTE — NURSING NOTE
Chief Complaint   Patient presents with     Smoking Cessation     Patient is here to quit smoking before surgery scheduled in August.      Pain     Patient is here to discuss back pain.      Shanthi Ha LPN at 10:50 AM on 6/28/2017.

## 2017-06-28 NOTE — MR AVS SNAPSHOT
After Visit Summary   6/28/2017    Lesly Roman    MRN: [id number]           Patient Information     Date Of Birth          1965        Visit Information        Provider Department      6/28/2017 10:40 AM Margaret Vargas MD [facility]        Today's Diagnoses     Screen for colon cancer    -  1    Chronic low back pain, unspecified back pain laterality, with sciatica presence unspecified        Encounter for smoking cessation counseling          Care Instructions    Primary Care Center Medication Refill Request Information:  * Please contact your pharmacy regarding ANY request for medication refills.  ** [facility] Prescription Fax = [phone]  * Please allow 3 business days for routine medication refills.  * Please allow 5 business days for controlled substance medication refills.     Primary Care Center Test Result notification information:  *You will be notified with in 7-10 days of your appointment day regarding the results of your test.  If you are on MyChart you will be notified as soon as the provider has reviewed the results and signed off on them.    Primary Care Center [phone]             Follow-ups after your visit        Additional Services     PHARMACY (MTM) REFERRAL       Your provider has referred you to: **[facility] Scheduling (numerous locations) [phone]   [email or web address]  [facility] [phone]   [email or web address]    Reason for Referral: smoking cessation    The [facility] department will contact you to schedule an appointment.  You may also schedule the appointment by calling [phone].  For [facility] [place] patients, please call [phone] to confirm/schedule your appointment on the next business day.    This service is designed to help you get the most  from your medications.  A specially trained Pharmacist will work closely with you and your providers to solve any questions, concerns, issues or problems related to your medications.    Please bring all of your prescription and non-prescription medications (such as vitamins, over-the-counter medications, and herbals) or a detailed medication list to your appointment.    If you have a glucose meter or other home monitoring information, please also bring this to your appointment (i.e. blood glucose log, blood pressure log, pain log, etc.).                  Your next 10 appointments already scheduled     Jul 18, 2017  9:40 AM CDT   (Arrive by 9:25 AM)   Return Visit with Han Pearce MD   [facility] Neurosurgery ([facility])    [address] [facility]  3rd Floor  [facility] [address] [phone]            Aug 17, 2017   Procedure with Han Pearce MD   [facility] [place] Same Day Surgery (--)    [address] [facility] [address] [phone]              Who to contact     Please call your clinic at [phone] to:    Ask questions about your health    Make or cancel appointments    Discuss your medicines    Learn about your test results    Speak to your doctor   If you have compliments or concerns about an experience at your clinic, or if you wish to file a complaint, please contact [facility] Patient Relations at [phone] or email us at [email or web address]         Additional Information About Your Visit        [employer] Information     [employer] gives you secure access to your electronic health record. If you see a primary care provider, you can also send messages to your care team and make appointments. If you have questions, please call your primary care clinic.  If you do not have a primary care provider, please call [phone] and they will assist you.      [employer] is an electronic gateway that provides easy, online access to  your medical records. With [employer], you can request a clinic appointment, read your test results, renew a prescription or communicate with your care team.     To access your existing account, please contact your [facility] or call [phone] for assistance.        Care EveryWhere ID     This is your Care EveryWhere ID. This could be used by other organizations to access your [place] medical records  [id number]        Your Vitals Were     Pulse Respirations Pulse Oximetry Breastfeeding? BMI (Body Mass Index)       78 20 99% No 28.68 kg/m2        Blood Pressure from Last 3 Encounters:   06/28/17 108/71   06/19/17 100/74   05/18/17 116/71    Weight from Last 3 Encounters:   06/28/17 70 kg (154 lb 4.8 oz)   06/19/17 69.4 kg (152 lb 14.4 oz)   04/02/17 68 kg (150 lb)              We Performed the Following     PHARMACY (MTM) REFERRAL          Today's Medication Changes          These changes are accurate as of: 6/28/17 11:12 AM.  If you have any questions, ask your nurse or doctor.               Start taking these medicines.        Dose/Directions    buPROPion 150 MG 12 hr tablet   Commonly known as:  WELLBUTRIN SR   Used for:  Encounter for smoking cessation counseling   Started by:  Margaret Vargas MD        Take 1 tablet daily ?3 days then increase to twice daily   Quantity:  60 tablet   Refills:  1       oxyCODONE 5 MG IR tablet   Commonly known as:  ROXICODONE   Used for:  Chronic low back pain, unspecified back pain laterality, with sciatica presence unspecified   Started by:  Margaret Vargas MD        Take one tablet every 8 hours as needed for severe pain.   Quantity:  30 tablet   Refills:  0         Stop taking these medicines if you haven't already. Please contact your care team if you have questions.     DULoxetine 60 MG EC capsule   Commonly known as:  CYMBALTA   Stopped by:  Margaret Vargas MD                Where to get your medicines      Some of these will  need a paper prescription and others can be bought over the counter.  Ask your nurse if you have questions.     Bring a paper prescription for each of these medications     buPROPion 150 MG 12 hr tablet    oxyCODONE 5 MG IR tablet                Primary Care Provider Office Phone # Fax #    STEVEN Vivar [phone]       PRIMARY CARE CENTER [address] [place] [address] 741  [facility] [address]        Equal Access to Services     KADEN ARAMBULA : Hadii aad ku hadasho Soomaali, waaxda luqadaha, qaybta kaalmada adeegyada, waxay idiin hayaan celine quachararosendo ryan . So [facility] [phone].    ATENCIÓN: Si habla español, tiene a handy disposición servicios gratuitos de asistencia lingüística. Ani al [phone].    We comply with applicable federal civil rights laws and [place] laws. We do not discriminate on the basis of race, color, national origin, age, disability sex, sexual orientation or gender identity.            Thank you!     Thank you for choosing [facility] PRIMARY CARE CLINIC  for your care. Our goal is always to provide you with excellent care. Hearing back from our patients is one way we can continue to improve our services. Please take a few minutes to complete the written survey that you may receive in the mail after your visit with us. Thank you!             Your Updated Medication List - Protect others around you: Learn how to safely use, store and throw away your medicines at www.disposemymeds.org.          This list is accurate as of: 6/28/17 11:12 AM.  Always use your most recent med list.                   Brand Name Dispense Instructions for use Diagnosis    buPROPion 150 MG 12 hr tablet    WELLBUTRIN SR    60 tablet    Take 1 tablet daily ?3 days then increase to twice daily    Encounter for smoking cessation counseling       cyclobenzaprine 10 MG tablet    FLEXERIL    42 tablet    Take 1 tablet (10 mg) by mouth 3 times daily as needed for muscle spasms    Headache(784.0)        gabapentin 300 MG capsule    NEURONTIN    90 capsule    Take 1 capsule (300 mg) by mouth 3 times daily Take 900mg at bedtime    Myofascial pain, Left lumbar radiculitis       IBUPROFEN PO      Take 200-600 mg by mouth as needed for moderate pain        levothyroxine 150 MCG tablet    SYNTHROID/LEVOTHROID    90 tablet    Take 1 tablet (150 mcg) by mouth daily Lab in 6 weeks.    Autoimmune hypothyroidism       meclizine 25 MG tablet    ANTIVERT    40 tablet    Take 1 tablet (25 mg) by mouth 3 times daily as needed for dizziness    Benign positional vertigo, left       omeprazole 20 MG CR capsule    priLOSEC    90 capsule    Take 1 capsule (20 mg) by mouth daily    Esophageal spasm       ondansetron 4 MG tablet    ZOFRAN    40 tablet    Take 1-2 pills every 4 hours for nausea as needed.  Maximum of 6 tablets daily    Benign positional vertigo, left       oxyCODONE 5 MG IR tablet    ROXICODONE    30 tablet    Take one tablet every 8 hours as needed for severe pain.    Chronic low back pain, unspecified back pain laterality, with sciatica presence unspecified       vitamin D 37008 UNIT capsule    ERGOCALCIFEROL     Take 50,000 Units by mouth once a week

## 2017-06-28 NOTE — PATIENT INSTRUCTIONS
[facility] Medication Refill Request Information:  * Please contact your pharmacy regarding ANY request for medication refills.  ** [facility] Prescription Fax = [phone]  * Please allow 3 business days for routine medication refills.  * Please allow 5 business days for controlled substance medication refills.     [facility] Test Result notification information:  *You will be notified with in 7-10 days of your appointment day regarding the results of your test.  If you are on MyChart you will be notified as soon as the provider has reviewed the results and signed off on them.    [facility] [phone]

## 2017-06-29 DIAGNOSIS — Z71.6 ENCOUNTER FOR SMOKING CESSATION COUNSELING: ICD-10-CM

## 2017-06-29 RX ORDER — BUPROPION HYDROCHLORIDE 150 MG/1
TABLET, EXTENDED RELEASE ORAL
Qty: 60 TABLET | Refills: 1 | Status: SHIPPED | OUTPATIENT
Start: 2017-06-29

## 2017-07-24 DIAGNOSIS — M54.16 LEFT LUMBAR RADICULITIS: ICD-10-CM

## 2017-07-24 DIAGNOSIS — M79.18 MYOFASCIAL PAIN: ICD-10-CM

## 2017-07-24 RX ORDER — GABAPENTIN 300 MG/1
300 CAPSULE ORAL 3 TIMES DAILY
Qty: 90 CAPSULE | Refills: 3 | Status: SHIPPED | OUTPATIENT
Start: 2017-07-24

## 2017-07-25 NOTE — TELEPHONE ENCOUNTER
gabapentin (NEURONTIN) 300 MG capsule      Last Written Prescription Date:  2/17/2017  Last Fill Quantity: 90,   # refills: 1  Last Office Visit : 6/28/2017  Future Office visit:  0    BP Readings from Last 1 Encounters:   06/28/17 108/71     Creatinine   Date Value Ref Range Status   04/02/2017 0.72 0.52 - 1.04 mg/dL Final   ]

## 2017-08-14 ENCOUNTER — TELEPHONE (OUTPATIENT)
Dept: NEUROSURGERY | Facility: CLINIC | Age: 52
End: 2017-08-14

## 2017-08-14 NOTE — TELEPHONE ENCOUNTER
Multiple messages have been left for patient and spouse by writer and scheduling staff, to get in contact with patient to discuss rescheduling patient to see Dr. Pearce prior to her scheduled surgery on 8/17. Dr. Pearce requested to see her back prior to surgery to assess her smoking cessation. Patient has no showed and canceled 2 appointments with Dr. Pearce for this assessment and has not returned calls to discuss. Left message for patient that she needs to return call to discuss or we may need to cancel her upcoming surgery. Writer's direct line was left for patient and spouse encouraging a call back to discuss plan.     Lisy Pinedo RN

## 2017-08-15 ENCOUNTER — TELEPHONE (OUTPATIENT)
Dept: NEUROSURGERY | Facility: CLINIC | Age: 52
End: 2017-08-15

## 2017-08-15 NOTE — TELEPHONE ENCOUNTER
Called and left message for patient to let her know that after speaking with Dr. Pearce he would like to cancel surgery scheduled for 8/17 as patient has not followed up with him prior to surgery and we have not been able to reach patient for further plan. Patient was advised to call back to schedule follow-up with Dr. Pearce if she wishes to move forward with surgery.     Lisy Pinedo RN

## 2020-03-01 ENCOUNTER — HEALTH MAINTENANCE LETTER (OUTPATIENT)
Age: 55
End: 2020-03-01

## 2020-12-14 ENCOUNTER — HEALTH MAINTENANCE LETTER (OUTPATIENT)
Age: 55
End: 2020-12-14

## 2021-04-17 ENCOUNTER — HEALTH MAINTENANCE LETTER (OUTPATIENT)
Age: 56
End: 2021-04-17

## 2021-10-02 ENCOUNTER — HEALTH MAINTENANCE LETTER (OUTPATIENT)
Age: 56
End: 2021-10-02

## 2022-05-08 ENCOUNTER — HEALTH MAINTENANCE LETTER (OUTPATIENT)
Age: 57
End: 2022-05-08

## 2023-01-14 ENCOUNTER — HEALTH MAINTENANCE LETTER (OUTPATIENT)
Age: 58
End: 2023-01-14

## 2023-06-02 ENCOUNTER — HEALTH MAINTENANCE LETTER (OUTPATIENT)
Age: 58
End: 2023-06-02